# Patient Record
Sex: MALE | Race: BLACK OR AFRICAN AMERICAN | NOT HISPANIC OR LATINO
[De-identification: names, ages, dates, MRNs, and addresses within clinical notes are randomized per-mention and may not be internally consistent; named-entity substitution may affect disease eponyms.]

---

## 2019-11-13 PROBLEM — Z00.00 ENCOUNTER FOR PREVENTIVE HEALTH EXAMINATION: Status: ACTIVE | Noted: 2019-11-13

## 2019-11-20 ENCOUNTER — APPOINTMENT (OUTPATIENT)
Dept: UROLOGY | Facility: CLINIC | Age: 59
End: 2019-11-20
Payer: COMMERCIAL

## 2019-11-20 VITALS
DIASTOLIC BLOOD PRESSURE: 100 MMHG | HEIGHT: 67 IN | HEART RATE: 76 BPM | BODY MASS INDEX: 32.18 KG/M2 | WEIGHT: 205 LBS | SYSTOLIC BLOOD PRESSURE: 150 MMHG

## 2019-11-20 DIAGNOSIS — Z80.42 FAMILY HISTORY OF MALIGNANT NEOPLASM OF PROSTATE: ICD-10-CM

## 2019-11-20 DIAGNOSIS — Z87.09 PERSONAL HISTORY OF OTHER DISEASES OF THE RESPIRATORY SYSTEM: ICD-10-CM

## 2019-11-20 DIAGNOSIS — Z83.3 FAMILY HISTORY OF DIABETES MELLITUS: ICD-10-CM

## 2019-11-20 PROCEDURE — 51798 US URINE CAPACITY MEASURE: CPT | Mod: 59

## 2019-11-20 PROCEDURE — 51741 ELECTRO-UROFLOWMETRY FIRST: CPT

## 2019-11-20 PROCEDURE — 99214 OFFICE O/P EST MOD 30 MIN: CPT | Mod: 25

## 2019-11-20 NOTE — PHYSICAL EXAM
[General Appearance - Well Developed] : well developed [Normal Appearance] : normal appearance [General Appearance - Well Nourished] : well nourished [General Appearance - In No Acute Distress] : no acute distress [Well Groomed] : well groomed [Abdomen Tenderness] : non-tender [Costovertebral Angle Tenderness] : no ~M costovertebral angle tenderness [Abdomen Soft] : soft [Penis Abnormality] : normal circumcised penis [Urethral Meatus] : meatus normal [Urinary Bladder Findings] : the bladder was normal on palpation [Scrotum] : the scrotum was normal [Testes Mass (___cm)] : there were no testicular masses [No Prostate Nodules] : no prostate nodules [Prostate Size ___ (0-4)] : prostate size [unfilled] (scale: 0-4) [] : no respiratory distress [Edema] : no peripheral edema [Respiration, Rhythm And Depth] : normal respiratory rhythm and effort [Exaggerated Use Of Accessory Muscles For Inspiration] : no accessory muscle use [Affect] : the affect was normal [Oriented To Time, Place, And Person] : oriented to person, place, and time [Mood] : the mood was normal [Not Anxious] : not anxious [Normal Station and Gait] : the gait and station were normal for the patient's age [No Focal Deficits] : no focal deficits [No Palpable Adenopathy] : no palpable adenopathy

## 2019-11-20 NOTE — HISTORY OF PRESENT ILLNESS
[FreeTextEntry1] : Patient is without new voiding complaints. He has nocturia x 1, mild postvoid dribbling. He is using sildenafil as needed. Patient had one episode of pink tinged urine. Recent PSA was 3.98ng/ml, free PSA was 13%

## 2019-11-20 NOTE — LETTER BODY
[Courtesy Letter:] : I had the pleasure of seeing your patient, [unfilled], in my office today. [Dear  ___] : Dear  [unfilled], [Please see my note below.] : Please see my note below. [Sincerely,] : Sincerely, [FreeTextEntry3] : Adam Osman MD\par

## 2019-11-20 NOTE — ASSESSMENT
[FreeTextEntry1] : Continue conservative management for now (discussed possible Rezum procedure - patient not interested in long term meds). Continue with sildenafil as needed. Will send urine for analysis, culture and cytology - if unremarkable, reevaluate in 6 months.

## 2019-11-20 NOTE — REVIEW OF SYSTEMS
[Wake up at night to urinate  How many times?  ___] : wakes up to urinate [unfilled] times during the night [Strong urge to urinate] : strong urge to urinate [Wait a long time to urinate] : waits a long time to urinate [Slow urine stream] : slow urine stream [Leakage of urine with urgency] : leakage of urine with urgency [Negative] : Heme/Lymph [Nocturia] : nocturia [Urine Infection (bladder/kidney)] : denies bladder/kidney infections [Pain after urination] : denies pain after urination [Pain at onset of urination] : denies pain during onset of urination [Pain during urination] : denies pain during urination [Discharge from urine canal] : denies discharge from urine canal [Blood in urine that you can see] : denies seeing blood in urine [Told you have blood in urine on a urine test] : denies being told that blood was present in a urine test [History of kidney stones] : denies history of kidney stones [Urine retention] : denies urine retention [Bladder pressure] : denies bladder pressure [Interrupted urine stream] : denies interrupted urine stream [Strain or push to urinate] : denies straining or pushing to urinate [Bladder fullness after urinating] : denies bladder fullness after urinating [Increased pain/discomfort with bladder filling] : denies increased pain/discomfort with bladder filling [Leakage of urine with straining, coughing, laughing] : denies leakage of urine with straining, coughing, and/or laughing [Bladder problems as child. If yes, describe..] : denies bladder problems as child [Unaware of when urine is leaking] : aware of when urine is leaking

## 2020-05-06 ENCOUNTER — APPOINTMENT (OUTPATIENT)
Dept: UROLOGY | Facility: CLINIC | Age: 60
End: 2020-05-06

## 2021-02-11 ENCOUNTER — APPOINTMENT (OUTPATIENT)
Dept: UROLOGY | Facility: CLINIC | Age: 61
End: 2021-02-11
Payer: COMMERCIAL

## 2021-02-11 VITALS — HEART RATE: 73 BPM | DIASTOLIC BLOOD PRESSURE: 95 MMHG | TEMPERATURE: 97.3 F | SYSTOLIC BLOOD PRESSURE: 144 MMHG

## 2021-02-11 PROCEDURE — 99072 ADDL SUPL MATRL&STAF TM PHE: CPT

## 2021-02-11 PROCEDURE — 51741 ELECTRO-UROFLOWMETRY FIRST: CPT

## 2021-02-11 PROCEDURE — 51798 US URINE CAPACITY MEASURE: CPT | Mod: 59

## 2021-02-11 PROCEDURE — 99214 OFFICE O/P EST MOD 30 MIN: CPT | Mod: 25

## 2021-02-11 RX ORDER — SILDENAFIL 20 MG/1
TABLET ORAL
Refills: 0 | Status: DISCONTINUED | COMMUNITY
End: 2021-02-11

## 2021-02-11 NOTE — PHYSICAL EXAM
[General Appearance - Well Developed] : well developed [General Appearance - Well Nourished] : well nourished [Normal Appearance] : normal appearance [Well Groomed] : well groomed [General Appearance - In No Acute Distress] : no acute distress [Abdomen Soft] : soft [Abdomen Tenderness] : non-tender [Costovertebral Angle Tenderness] : no ~M costovertebral angle tenderness [Urethral Meatus] : meatus normal [Penis Abnormality] : normal circumcised penis [Urinary Bladder Findings] : the bladder was normal on palpation [Scrotum] : the scrotum was normal [Testes Mass (___cm)] : there were no testicular masses [No Prostate Nodules] : no prostate nodules [Prostate Size ___ (0-4)] : prostate size [unfilled] (scale: 0-4) [Edema] : no peripheral edema [] : no respiratory distress [Respiration, Rhythm And Depth] : normal respiratory rhythm and effort [Exaggerated Use Of Accessory Muscles For Inspiration] : no accessory muscle use [Oriented To Time, Place, And Person] : oriented to person, place, and time [Affect] : the affect was normal [Mood] : the mood was normal [Not Anxious] : not anxious [Normal Station and Gait] : the gait and station were normal for the patient's age [No Focal Deficits] : no focal deficits [No Palpable Adenopathy] : no palpable adenopathy

## 2021-02-22 ENCOUNTER — NON-APPOINTMENT (OUTPATIENT)
Age: 61
End: 2021-02-22

## 2021-02-22 NOTE — HISTORY OF PRESENT ILLNESS
[FreeTextEntry1] : The patient is without new urologic complaints.  He is voiding with somewhat slow stream and mild urgency but no dysuria or hematuria.  He has nocturia x1.  The patient has adequate erections.

## 2021-02-22 NOTE — LETTER BODY
[Dear  ___] : Dear  [unfilled], [Courtesy Letter:] : I had the pleasure of seeing your patient, [unfilled], in my office today. [Please see my note below.] : Please see my note below. [Consult Closing:] : Thank you very much for allowing me to participate in the care of this patient.  If you have any questions, please do not hesitate to contact me. [Sincerely,] : Sincerely, [FreeTextEntry3] : Adam Osman MD\par

## 2021-02-22 NOTE — REVIEW OF SYSTEMS
[Wake up at night to urinate  How many times?  ___] : wakes up to urinate [unfilled] times during the night [Slow urine stream] : slow urine stream [Negative] : Heme/Lymph [Nocturia] : nocturia [Strong urge to urinate] : strong urge to urinate [Wait a long time to urinate] : waits a long time to urinate [Leakage of urine with urgency] : leakage of urine with urgency [Urine Infection (bladder/kidney)] : denies bladder/kidney infections [Pain during urination] : denies pain during urination [Pain at onset of urination] : denies pain during onset of urination [Pain after urination] : denies pain after urination [Blood in urine that you can see] : denies seeing blood in urine [Told you have blood in urine on a urine test] : denies being told that blood was present in a urine test [Discharge from urine canal] : denies discharge from urine canal [History of kidney stones] : denies history of kidney stones [Urine retention] : denies urine retention [Bladder pressure] : denies bladder pressure [Strain or push to urinate] : denies straining or pushing to urinate [Interrupted urine stream] : denies interrupted urine stream [Bladder fullness after urinating] : denies bladder fullness after urinating [Increased pain/discomfort with bladder filling] : denies increased pain/discomfort with bladder filling [Bladder problems as child. If yes, describe..] : denies bladder problems as child [Leakage of urine with straining, coughing, laughing] : denies leakage of urine with straining, coughing, and/or laughing [Unaware of when urine is leaking] : aware of when urine is leaking

## 2021-02-22 NOTE — ASSESSMENT
[FreeTextEntry1] : The patient is stable clinically and we will continue him on conservative management of his BPH.  He will have a PSA determination and if that is unremarkable, I will see him again in 6 months.

## 2021-08-19 ENCOUNTER — APPOINTMENT (OUTPATIENT)
Dept: UROLOGY | Facility: CLINIC | Age: 61
End: 2021-08-19
Payer: COMMERCIAL

## 2021-08-19 ENCOUNTER — APPOINTMENT (OUTPATIENT)
Dept: UROLOGY | Facility: CLINIC | Age: 61
End: 2021-08-19

## 2021-08-19 VITALS
OXYGEN SATURATION: 100 % | HEART RATE: 80 BPM | TEMPERATURE: 97.7 F | DIASTOLIC BLOOD PRESSURE: 84 MMHG | RESPIRATION RATE: 18 BRPM | SYSTOLIC BLOOD PRESSURE: 128 MMHG

## 2021-08-19 PROCEDURE — 51798 US URINE CAPACITY MEASURE: CPT

## 2021-08-19 PROCEDURE — 99214 OFFICE O/P EST MOD 30 MIN: CPT

## 2021-08-19 NOTE — HISTORY OF PRESENT ILLNESS
[FreeTextEntry1] : The patient is without change in status.  He is voiding with a somewhat slow stream, mild hesitancy and nocturia x0-1.  He denies dysuria or hematuria.  PSA on the last visit was elevated 4.2 ng/mL (up from 3.89 previously).  Patient is reporting adequate erectile function.

## 2021-08-19 NOTE — ASSESSMENT
[FreeTextEntry1] : The patient is stable clinically with mild worsening of his symptoms but this is not bothersome.  He has good bladder emptying.  His mild PSA elevation is likely due to his BPH.  We will send a repeat study today including the 4K score and if these are unremarkable, we will continue conservative management and reevaluate him again in 6 months.

## 2021-08-19 NOTE — LETTER BODY
[Dear  ___] : Dear  [unfilled], [Courtesy Letter:] : I had the pleasure of seeing your patient, [unfilled], in my office today. [Please see my note below.] : Please see my note below. [Consult Closing:] : Thank you very much for allowing me to participate in the care of this patient.  If you have any questions, please do not hesitate to contact me. [Sincerely,] : Sincerely, [FreeTextEntry3] : Adam Osman MD

## 2021-08-19 NOTE — PHYSICAL EXAM
[General Appearance - Well Developed] : well developed [Normal Appearance] : normal appearance [General Appearance - Well Nourished] : well nourished [Well Groomed] : well groomed [General Appearance - In No Acute Distress] : no acute distress [Abdomen Soft] : soft [Abdomen Tenderness] : non-tender [Costovertebral Angle Tenderness] : no ~M costovertebral angle tenderness [Urethral Meatus] : meatus normal [Penis Abnormality] : normal circumcised penis [Urinary Bladder Findings] : the bladder was normal on palpation [Scrotum] : the scrotum was normal [Testes Mass (___cm)] : there were no testicular masses [No Prostate Nodules] : no prostate nodules [Prostate Size ___ (0-4)] : prostate size [unfilled] (scale: 0-4) [Edema] : no peripheral edema [] : no respiratory distress [Respiration, Rhythm And Depth] : normal respiratory rhythm and effort [Exaggerated Use Of Accessory Muscles For Inspiration] : no accessory muscle use [Oriented To Time, Place, And Person] : oriented to person, place, and time [Affect] : the affect was normal [Mood] : the mood was normal [Not Anxious] : not anxious [Normal Station and Gait] : the gait and station were normal for the patient's age [No Focal Deficits] : no focal deficits [No Palpable Adenopathy] : no palpable adenopathy

## 2021-08-19 NOTE — REVIEW OF SYSTEMS
yes [Wait a long time to urinate] : waits a long time to urinate [Slow urine stream] : slow urine stream [Nocturia] : nocturia [Wake up at night to urinate  How many times?  ___] : wakes up to urinate [unfilled] times during the night [Strong urge to urinate] : strong urge to urinate [Leakage of urine with urgency] : leakage of urine with urgency [Negative] : Heme/Lymph [Urine Infection (bladder/kidney)] : denies bladder/kidney infections [Pain during urination] : denies pain during urination [Pain at onset of urination] : denies pain during onset of urination [Pain after urination] : denies pain after urination [Blood in urine that you can see] : denies seeing blood in urine [Told you have blood in urine on a urine test] : denies being told that blood was present in a urine test [Discharge from urine canal] : denies discharge from urine canal [History of kidney stones] : denies history of kidney stones [Urine retention] : denies urine retention [Bladder pressure] : denies bladder pressure [Strain or push to urinate] : denies straining or pushing to urinate [Interrupted urine stream] : denies interrupted urine stream [Bladder fullness after urinating] : denies bladder fullness after urinating [Increased pain/discomfort with bladder filling] : denies increased pain/discomfort with bladder filling [Bladder problems as child. If yes, describe..] : denies bladder problems as child [Leakage of urine with straining, coughing, laughing] : denies leakage of urine with straining, coughing, and/or laughing [Unaware of when urine is leaking] : aware of when urine is leaking

## 2021-08-20 LAB
PSA FREE FLD-MCNC: 9 %
PSA FREE SERPL-MCNC: 0.5 NG/ML
PSA SERPL-MCNC: 5.44 NG/ML

## 2021-08-25 ENCOUNTER — NON-APPOINTMENT (OUTPATIENT)
Age: 61
End: 2021-08-25

## 2021-08-30 ENCOUNTER — APPOINTMENT (OUTPATIENT)
Dept: UROLOGY | Facility: CLINIC | Age: 61
End: 2021-08-30

## 2021-08-30 LAB
4K SCORE CALCULATION: 41 %
FREE PSA: 0.53 NG/ML
PERCENT FREE PSA: 10 %
TOTAL PSA: 5.51 NG/ML

## 2021-09-16 ENCOUNTER — NON-APPOINTMENT (OUTPATIENT)
Age: 61
End: 2021-09-16

## 2021-09-17 ENCOUNTER — EMERGENCY (EMERGENCY)
Facility: HOSPITAL | Age: 61
LOS: 1 days | Discharge: HOME | End: 2021-09-17
Admitting: EMERGENCY MEDICINE
Payer: COMMERCIAL

## 2021-09-17 VITALS
SYSTOLIC BLOOD PRESSURE: 146 MMHG | TEMPERATURE: 98 F | HEIGHT: 67 IN | HEART RATE: 71 BPM | OXYGEN SATURATION: 97 % | DIASTOLIC BLOOD PRESSURE: 88 MMHG | WEIGHT: 190.04 LBS | RESPIRATION RATE: 16 BRPM

## 2021-09-17 VITALS
TEMPERATURE: 99 F | SYSTOLIC BLOOD PRESSURE: 140 MMHG | DIASTOLIC BLOOD PRESSURE: 84 MMHG | OXYGEN SATURATION: 98 % | RESPIRATION RATE: 18 BRPM | HEART RATE: 70 BPM

## 2021-09-17 DIAGNOSIS — Z88.0 ALLERGY STATUS TO PENICILLIN: ICD-10-CM
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
## 2021-09-17 DIAGNOSIS — R51.9 HEADACHE, UNSPECIFIED: ICD-10-CM
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
## 2021-09-17 DIAGNOSIS — S09.90XA UNSPECIFIED INJURY OF HEAD, INITIAL ENCOUNTER: ICD-10-CM

## 2021-09-17 DIAGNOSIS — R11.0 NAUSEA: ICD-10-CM

## 2021-09-17 PROCEDURE — 70450 CT HEAD/BRAIN W/O DYE: CPT | Mod: 26,MG

## 2021-09-17 PROCEDURE — G1004: CPT

## 2021-09-17 PROCEDURE — 99284 EMERGENCY DEPT VISIT MOD MDM: CPT

## 2021-09-17 PROCEDURE — 99284 EMERGENCY DEPT VISIT MOD MDM: CPT | Mod: 25

## 2021-09-17 PROCEDURE — 70450 CT HEAD/BRAIN W/O DYE: CPT | Mod: MG

## 2021-09-17 NOTE — ED PROVIDER NOTE - PATIENT PORTAL LINK FT
You can access the FollowMyHealth Patient Portal offered by [facility] by registering at the following website: [email or web address]. By joining [employer]’s FollowMyHealth portal, you will also be able to view your health information using other applications (apps) compatible with our system.

## 2021-09-17 NOTE — ED PROVIDER NOTE - MUSCULOSKELETAL, MLM
Spine appears normal, no spine tenderness, range of motion is not limited, no muscle or joint tenderness
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
## 2021-09-30 ENCOUNTER — APPOINTMENT (OUTPATIENT)
Dept: UROLOGY | Facility: CLINIC | Age: 61
End: 2021-09-30

## 2021-10-25 ENCOUNTER — RESULT REVIEW (OUTPATIENT)
Age: 61
End: 2021-10-25

## 2021-10-25 ENCOUNTER — TRANSCRIPTION ENCOUNTER (OUTPATIENT)
Age: 61
End: 2021-10-25

## 2021-10-25 ENCOUNTER — EMERGENCY (EMERGENCY)
Facility: HOSPITAL | Age: 61
LOS: 1 days | Discharge: HOME | End: 2021-10-25
Attending: EMERGENCY MEDICINE | Admitting: EMERGENCY MEDICINE
Payer: COMMERCIAL

## 2021-10-25 VITALS
HEIGHT: 67 IN | RESPIRATION RATE: 18 BRPM | HEART RATE: 77 BPM | DIASTOLIC BLOOD PRESSURE: 78 MMHG | TEMPERATURE: 99 F | SYSTOLIC BLOOD PRESSURE: 163 MMHG | OXYGEN SATURATION: 97 %

## 2021-10-25 DIAGNOSIS — Z88.0 ALLERGY STATUS TO PENICILLIN: ICD-10-CM

## 2021-10-25 DIAGNOSIS — Z01.818 ENCOUNTER FOR OTHER PREPROCEDURAL EXAMINATION: ICD-10-CM

## 2021-10-25 PROCEDURE — 71046 X-RAY EXAM CHEST 2 VIEWS: CPT

## 2021-10-25 PROCEDURE — 99283 EMERGENCY DEPT VISIT LOW MDM: CPT

## 2021-10-25 PROCEDURE — 99283 EMERGENCY DEPT VISIT LOW MDM: CPT | Mod: 25

## 2021-10-25 PROCEDURE — 71046 X-RAY EXAM CHEST 2 VIEWS: CPT | Mod: 26

## 2021-10-25 NOTE — ED PROVIDER NOTE - CLINICAL SUMMARY MEDICAL DECISION MAKING FREE TEXT BOX
61M here for preop CXR prior to scheduled prostate bx at [facility] tomorrow. He has no complaints.     CXR performed and pt informed results will be viewable by his providers at [facility] tomorrow.     Stable for DC.

## 2021-10-25 NOTE — ED PROVIDER NOTE - NSFOLLOWUPINSTRUCTIONS_ED_ALL_ED_FT
Please follow up at [facility] tomorrow for your scheduled prostate biopsy.   Follow preop instructions provided by your doctor.

## 2021-10-25 NOTE — ED ADULT NURSE NOTE - OBJECTIVE STATEMENT
60 yo M presents to ED stating, " I need a CXR for clearance for my prostate procedure tm." Denies any complaints at this time. Pt A&Ox4, ambulatory with steady gait, speaking in clear/full sentences, no acute distress, vital signs stable.

## 2021-10-25 NOTE — ED PROVIDER NOTE - OBJECTIVE STATEMENT
61M here for preop CXR prior to scheduled prostate bx at [facility] tomorrow. He has no complaints.

## 2021-10-25 NOTE — ED ADULT TRIAGE NOTE - CHIEF COMPLAINT QUOTE
Pt sent in by MD for pre-op chest XR. Pt reports scheduled for prostate biopsy tomorrow and "I just need a chest x ray to clear me."

## 2021-10-25 NOTE — ED PROVIDER NOTE - PATIENT PORTAL LINK FT
You can access the FollowMyHealth Patient Portal offered by [facility] by registering at the following website: [email or web address]. By joining [email or web address]’s FollowMyHealth portal, you will also be able to view your health information using other applications (apps) compatible with our system.

## 2021-10-26 ENCOUNTER — OUTPATIENT (OUTPATIENT)
Dept: OUTPATIENT SERVICES | Facility: HOSPITAL | Age: 61
LOS: 1 days | Discharge: HOME | End: 2021-10-26
Payer: COMMERCIAL

## 2021-10-26 ENCOUNTER — NON-APPOINTMENT (OUTPATIENT)
Age: 61
End: 2021-10-26

## 2021-10-26 ENCOUNTER — APPOINTMENT (OUTPATIENT)
Dept: UROLOGY | Facility: AMBULATORY SURGERY CENTER | Age: 61
End: 2021-10-26

## 2021-10-26 LAB — GLUCOSE BLDC GLUCOMTR-MCNC: 129 MG/DL — HIGH (ref 70–99)

## 2021-10-26 PROCEDURE — 88305 TISSUE EXAM BY PATHOLOGIST: CPT | Mod: 26

## 2021-10-26 PROCEDURE — 55706 BX PRST8 NDL SAT SAMPLING: CPT

## 2021-10-26 PROCEDURE — 76872 US TRANSRECTAL: CPT | Mod: 26

## 2021-10-26 PROCEDURE — 55700: CPT

## 2021-11-02 ENCOUNTER — NON-APPOINTMENT (OUTPATIENT)
Age: 61
End: 2021-11-02

## 2021-11-02 LAB — SURGICAL PATHOLOGY STUDY: SIGNIFICANT CHANGE UP

## 2021-12-02 ENCOUNTER — LABORATORY RESULT (OUTPATIENT)
Age: 61
End: 2021-12-02

## 2021-12-02 ENCOUNTER — APPOINTMENT (OUTPATIENT)
Dept: UROLOGY | Facility: CLINIC | Age: 61
End: 2021-12-02
Payer: COMMERCIAL

## 2021-12-02 VITALS
BODY MASS INDEX: 30.61 KG/M2 | OXYGEN SATURATION: 97 % | HEART RATE: 80 BPM | TEMPERATURE: 97.6 F | WEIGHT: 195 LBS | RESPIRATION RATE: 16 BRPM | HEIGHT: 67 IN | DIASTOLIC BLOOD PRESSURE: 81 MMHG | SYSTOLIC BLOOD PRESSURE: 132 MMHG

## 2021-12-02 PROCEDURE — 99215 OFFICE O/P EST HI 40 MIN: CPT

## 2021-12-02 NOTE — ASSESSMENT
[FreeTextEntry1] : Thus, the patient is stable clinically.  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  Today we had an extensive discussion about MRI, biopsy and Decipher results and we discussed all the potential therapeutic strategies including active surveillance, brachytherapy, external beam radiation therapy, robotically assisted laparoscopic prostatectomy and focal therapy (cryosurgery, HIFU) at length.  We discussed the timing of potential treatments and follow-ups.  The patient is opting for active surveillance and he understands that he will require periodic digital rectal examinations, PSA tests, MRIs and possible repeat biopsies.  He was reassured about his hematospermia which should resolve.  The patient will come back in 2 months for reassessment and repeat PSA determination and if that is stable, he will have a repeat study 3 months after that and will have a repeat MRI at that time as well.

## 2021-12-02 NOTE — HISTORY OF PRESENT ILLNESS
[FreeTextEntry1] : The patient is without new clinical complaints except for gradually decreasing hematospermia.  Prostate biopsy did reveal a [place] 7 (3+4) adenocarcinoma in the region of interest identified by MRI, as well as a [place] 6 (3+3) adenocarcinoma in one of the systematic biopsies from another location.  The patient had a Decipher genetic test which revealed low risk disease.

## 2021-12-02 NOTE — PHYSICAL EXAM
[General Appearance - Well Developed] : well developed [General Appearance - Well Nourished] : well nourished [Normal Appearance] : normal appearance [Well Groomed] : well groomed [General Appearance - In No Acute Distress] : no acute distress [Abdomen Soft] : soft [Abdomen Tenderness] : non-tender [Costovertebral Angle Tenderness] : no ~M costovertebral angle tenderness [Urethral Meatus] : meatus normal [Penis Abnormality] : normal circumcised penis [Urinary Bladder Findings] : the bladder was normal on palpation [Scrotum] : the scrotum was normal [Testes Mass (___cm)] : there were no testicular masses [Edema] : no peripheral edema [] : no respiratory distress [Respiration, Rhythm And Depth] : normal respiratory rhythm and effort [Exaggerated Use Of Accessory Muscles For Inspiration] : no accessory muscle use [Oriented To Time, Place, And Person] : oriented to person, place, and time [Affect] : the affect was normal [Mood] : the mood was normal [Not Anxious] : not anxious [Normal Station and Gait] : the gait and station were normal for the patient's age [No Focal Deficits] : no focal deficits [No Palpable Adenopathy] : no palpable adenopathy

## 2021-12-02 NOTE — REVIEW OF SYSTEMS
[Wake up at night to urinate  How many times?  ___] : wakes up to urinate [unfilled] times during the night [Negative] : Heme/Lymph [Nocturia] : nocturia [Urine Infection (bladder/kidney)] : denies bladder/kidney infections [Pain during urination] : denies pain during urination [Pain at onset of urination] : denies pain during onset of urination [Pain after urination] : denies pain after urination [Blood in urine that you can see] : denies seeing blood in urine [Told you have blood in urine on a urine test] : denies being told that blood was present in a urine test [Discharge from urine canal] : denies discharge from urine canal [History of kidney stones] : denies history of kidney stones [Urine retention] : denies urine retention [Strong urge to urinate] : strong urge to urinate [Bladder pressure] : denies bladder pressure [Strain or push to urinate] : denies straining or pushing to urinate [Wait a long time to urinate] : waits a long time to urinate [Slow urine stream] : slow urine stream [Interrupted urine stream] : denies interrupted urine stream [Bladder fullness after urinating] : denies bladder fullness after urinating [Increased pain/discomfort with bladder filling] : denies increased pain/discomfort with bladder filling [Bladder problems as child. If yes, describe..] : denies bladder problems as child [Leakage of urine with urgency] : leakage of urine with urgency [Leakage of urine with straining, coughing, laughing] : denies leakage of urine with straining, coughing, and/or laughing [Unaware of when urine is leaking] : aware of when urine is leaking

## 2022-02-24 ENCOUNTER — APPOINTMENT (OUTPATIENT)
Dept: UROLOGY | Facility: CLINIC | Age: 62
End: 2022-02-24
Payer: COMMERCIAL

## 2022-02-24 VITALS
HEART RATE: 78 BPM | RESPIRATION RATE: 18 BRPM | HEIGHT: 67 IN | SYSTOLIC BLOOD PRESSURE: 138 MMHG | DIASTOLIC BLOOD PRESSURE: 91 MMHG | TEMPERATURE: 97.8 F | OXYGEN SATURATION: 98 % | WEIGHT: 199 LBS | BODY MASS INDEX: 31.23 KG/M2

## 2022-02-24 PROCEDURE — 51798 US URINE CAPACITY MEASURE: CPT

## 2022-02-24 PROCEDURE — 51741 ELECTRO-UROFLOWMETRY FIRST: CPT

## 2022-02-24 PROCEDURE — 99214 OFFICE O/P EST MOD 30 MIN: CPT | Mod: 25

## 2022-02-24 NOTE — REVIEW OF SYSTEMS
[Wake up at night to urinate  How many times?  ___] : wakes up to urinate [unfilled] times during the night [Nocturia] : nocturia [Urine Infection (bladder/kidney)] : denies bladder/kidney infections [Pain during urination] : denies pain during urination [Pain at onset of urination] : denies pain during onset of urination [Pain after urination] : denies pain after urination [Blood in urine that you can see] : denies seeing blood in urine [Told you have blood in urine on a urine test] : denies being told that blood was present in a urine test [Discharge from urine canal] : denies discharge from urine canal [History of kidney stones] : denies history of kidney stones [Urine retention] : denies urine retention [Strong urge to urinate] : strong urge to urinate [Bladder pressure] : denies bladder pressure [Strain or push to urinate] : denies straining or pushing to urinate [Wait a long time to urinate] : waits a long time to urinate [Slow urine stream] : slow urine stream [Interrupted urine stream] : denies interrupted urine stream [Bladder fullness after urinating] : denies bladder fullness after urinating [Increased pain/discomfort with bladder filling] : denies increased pain/discomfort with bladder filling [Bladder problems as child. If yes, describe..] : denies bladder problems as child [Leakage of urine with urgency] : leakage of urine with urgency [Leakage of urine with straining, coughing, laughing] : denies leakage of urine with straining, coughing, and/or laughing [Unaware of when urine is leaking] : aware of when urine is leaking [Negative] : Heme/Lymph

## 2022-02-24 NOTE — ASSESSMENT
[FreeTextEntry1] : The patient stable clinically continue active surveillance of his prostate cancer and conservative management of his BPH.  The patient will have his PSA determination today and if that is stable we will see him again in 3 months.  He will have a repeat MRI prior to his next visit.

## 2022-02-24 NOTE — HISTORY OF PRESENT ILLNESS
[FreeTextEntry1] : The patient is without new clinical complaints.  He is voiding with an adequate stream, nocturia x1 and no dysuria or hematuria.

## 2022-02-25 LAB — PSA SERPL-MCNC: 5.8 NG/ML

## 2022-03-11 ENCOUNTER — NON-APPOINTMENT (OUTPATIENT)
Age: 62
End: 2022-03-11

## 2022-04-22 ENCOUNTER — NON-APPOINTMENT (OUTPATIENT)
Age: 62
End: 2022-04-22

## 2022-05-17 ENCOUNTER — EMERGENCY (EMERGENCY)
Facility: HOSPITAL | Age: 62
LOS: 1 days | Discharge: HOME | End: 2022-05-17
Attending: EMERGENCY MEDICINE | Admitting: EMERGENCY MEDICINE
Payer: COMMERCIAL

## 2022-05-17 VITALS
TEMPERATURE: 98 F | SYSTOLIC BLOOD PRESSURE: 140 MMHG | RESPIRATION RATE: 18 BRPM | HEART RATE: 88 BPM | DIASTOLIC BLOOD PRESSURE: 78 MMHG | OXYGEN SATURATION: 98 %

## 2022-05-17 VITALS
TEMPERATURE: 98 F | HEIGHT: 67 IN | RESPIRATION RATE: 16 BRPM | SYSTOLIC BLOOD PRESSURE: 143 MMHG | DIASTOLIC BLOOD PRESSURE: 96 MMHG | WEIGHT: 205.03 LBS | HEART RATE: 89 BPM | OXYGEN SATURATION: 96 %

## 2022-05-17 DIAGNOSIS — R07.89 OTHER CHEST PAIN: ICD-10-CM

## 2022-05-17 DIAGNOSIS — Z20.822 CONTACT WITH AND (SUSPECTED) EXPOSURE TO COVID-19: ICD-10-CM

## 2022-05-17 DIAGNOSIS — Z88.0 ALLERGY STATUS TO PENICILLIN: ICD-10-CM

## 2022-05-17 DIAGNOSIS — J45.901 UNSPECIFIED ASTHMA WITH (ACUTE) EXACERBATION: ICD-10-CM

## 2022-05-17 DIAGNOSIS — R06.02 SHORTNESS OF BREATH: ICD-10-CM

## 2022-05-17 DIAGNOSIS — J30.2 OTHER SEASONAL ALLERGIC RHINITIS: ICD-10-CM

## 2022-05-17 DIAGNOSIS — C61 MALIGNANT NEOPLASM OF PROSTATE: ICD-10-CM

## 2022-05-17 LAB
ALBUMIN SERPL ELPH-MCNC: 4.5 G/DL — SIGNIFICANT CHANGE UP (ref 3.3–5)
ALP SERPL-CCNC: 121 U/L — HIGH (ref 40–120)
ALT FLD-CCNC: 25 U/L — SIGNIFICANT CHANGE UP (ref 10–45)
ANION GAP SERPL CALC-SCNC: 10 MMOL/L — SIGNIFICANT CHANGE UP (ref 5–17)
APTT BLD: 32.7 SEC — SIGNIFICANT CHANGE UP (ref 27.5–35.5)
AST SERPL-CCNC: 34 U/L — SIGNIFICANT CHANGE UP (ref 10–40)
BASOPHILS # BLD AUTO: 0.05 K/UL — SIGNIFICANT CHANGE UP (ref 0–0.2)
BASOPHILS NFR BLD AUTO: 1.1 % — SIGNIFICANT CHANGE UP (ref 0–2)
BILIRUB SERPL-MCNC: 0.5 MG/DL — SIGNIFICANT CHANGE UP (ref 0.2–1.2)
BUN SERPL-MCNC: 9 MG/DL — SIGNIFICANT CHANGE UP (ref 7–23)
CALCIUM SERPL-MCNC: 9.7 MG/DL — SIGNIFICANT CHANGE UP (ref 8.4–10.5)
CHLORIDE SERPL-SCNC: 101 MMOL/L — SIGNIFICANT CHANGE UP (ref 96–108)
CO2 SERPL-SCNC: 29 MMOL/L — SIGNIFICANT CHANGE UP (ref 22–31)
CREAT SERPL-MCNC: 0.98 MG/DL — SIGNIFICANT CHANGE UP (ref 0.5–1.3)
EGFR: 87 ML/MIN/1.73M2 — SIGNIFICANT CHANGE UP
EOSINOPHIL # BLD AUTO: 0.28 K/UL — SIGNIFICANT CHANGE UP (ref 0–0.5)
EOSINOPHIL NFR BLD AUTO: 6.2 % — HIGH (ref 0–6)
GLUCOSE SERPL-MCNC: 111 MG/DL — HIGH (ref 70–99)
HCT VFR BLD CALC: 47.5 % — SIGNIFICANT CHANGE UP (ref 39–50)
HGB BLD-MCNC: 15.1 G/DL — SIGNIFICANT CHANGE UP (ref 13–17)
IMM GRANULOCYTES NFR BLD AUTO: 0.2 % — SIGNIFICANT CHANGE UP (ref 0–1.5)
INR BLD: 1.02 — SIGNIFICANT CHANGE UP (ref 0.88–1.16)
LYMPHOCYTES # BLD AUTO: 1.68 K/UL — SIGNIFICANT CHANGE UP (ref 1–3.3)
LYMPHOCYTES # BLD AUTO: 37.3 % — SIGNIFICANT CHANGE UP (ref 13–44)
MCHC RBC-ENTMCNC: 27 PG — SIGNIFICANT CHANGE UP (ref 27–34)
MCHC RBC-ENTMCNC: 31.8 GM/DL — LOW (ref 32–36)
MCV RBC AUTO: 84.8 FL — SIGNIFICANT CHANGE UP (ref 80–100)
MONOCYTES # BLD AUTO: 0.52 K/UL — SIGNIFICANT CHANGE UP (ref 0–0.9)
MONOCYTES NFR BLD AUTO: 11.6 % — SIGNIFICANT CHANGE UP (ref 2–14)
NEUTROPHILS # BLD AUTO: 1.96 K/UL — SIGNIFICANT CHANGE UP (ref 1.8–7.4)
NEUTROPHILS NFR BLD AUTO: 43.6 % — SIGNIFICANT CHANGE UP (ref 43–77)
NRBC # BLD: 0 /100 WBCS — SIGNIFICANT CHANGE UP (ref 0–0)
PLATELET # BLD AUTO: 187 K/UL — SIGNIFICANT CHANGE UP (ref 150–400)
POTASSIUM SERPL-MCNC: 3.8 MMOL/L — SIGNIFICANT CHANGE UP (ref 3.5–5.3)
POTASSIUM SERPL-SCNC: 3.8 MMOL/L — SIGNIFICANT CHANGE UP (ref 3.5–5.3)
PROT SERPL-MCNC: 7.6 G/DL — SIGNIFICANT CHANGE UP (ref 6–8.3)
PROTHROM AB SERPL-ACNC: 12.2 SEC — SIGNIFICANT CHANGE UP (ref 10.5–13.4)
RBC # BLD: 5.6 M/UL — SIGNIFICANT CHANGE UP (ref 4.2–5.8)
RBC # FLD: 12.6 % — SIGNIFICANT CHANGE UP (ref 10.3–14.5)
SODIUM SERPL-SCNC: 140 MMOL/L — SIGNIFICANT CHANGE UP (ref 135–145)
TROPONIN T SERPL-MCNC: 0.01 NG/ML — SIGNIFICANT CHANGE UP (ref 0–0.01)
WBC # BLD: 4.5 K/UL — SIGNIFICANT CHANGE UP (ref 3.8–10.5)
WBC # FLD AUTO: 4.5 K/UL — SIGNIFICANT CHANGE UP (ref 3.8–10.5)

## 2022-05-17 PROCEDURE — 85730 THROMBOPLASTIN TIME PARTIAL: CPT

## 2022-05-17 PROCEDURE — 99285 EMERGENCY DEPT VISIT HI MDM: CPT | Mod: 25

## 2022-05-17 PROCEDURE — 80053 COMPREHEN METABOLIC PANEL: CPT

## 2022-05-17 PROCEDURE — 94640 AIRWAY INHALATION TREATMENT: CPT

## 2022-05-17 PROCEDURE — 71046 X-RAY EXAM CHEST 2 VIEWS: CPT

## 2022-05-17 PROCEDURE — 71046 X-RAY EXAM CHEST 2 VIEWS: CPT | Mod: 26

## 2022-05-17 PROCEDURE — U0005: CPT

## 2022-05-17 PROCEDURE — 93005 ELECTROCARDIOGRAM TRACING: CPT

## 2022-05-17 PROCEDURE — 85610 PROTHROMBIN TIME: CPT

## 2022-05-17 PROCEDURE — U0003: CPT

## 2022-05-17 PROCEDURE — 36415 COLL VENOUS BLD VENIPUNCTURE: CPT

## 2022-05-17 PROCEDURE — 84484 ASSAY OF TROPONIN QUANT: CPT

## 2022-05-17 PROCEDURE — 96374 THER/PROPH/DIAG INJ IV PUSH: CPT

## 2022-05-17 PROCEDURE — 99284 EMERGENCY DEPT VISIT MOD MDM: CPT | Mod: 25

## 2022-05-17 PROCEDURE — 93010 ELECTROCARDIOGRAM REPORT: CPT | Mod: NC

## 2022-05-17 PROCEDURE — 85025 COMPLETE CBC W/AUTO DIFF WBC: CPT

## 2022-05-17 RX ORDER — IPRATROPIUM/ALBUTEROL SULFATE 18-103MCG
3 AEROSOL WITH ADAPTER (GRAM) INHALATION
Refills: 0 | Status: COMPLETED | OUTPATIENT
Start: 2022-05-17 | End: 2022-05-17

## 2022-05-17 RX ADMIN — Medication 3 MILLILITER(S): at 20:01

## 2022-05-17 RX ADMIN — Medication 125 MILLIGRAM(S): at 19:59

## 2022-05-17 RX ADMIN — Medication 3 MILLILITER(S): at 19:59

## 2022-05-17 RX ADMIN — Medication 3 MILLILITER(S): at 18:52

## 2022-05-17 NOTE — ED PROVIDER NOTE - PHYSICAL EXAMINATION
CONST: nontoxic NAD speaking in full sentences  HEAD: atraumatic  EYES: conjunctivae clear  ENT: mmm  NECK: supple/FROM, no jvd  CARD: rrr no murmurs  CHEST: +few scattered end exp wheezing, no rales/rhonchi/stridor/tripoding/tachypnea  ABD: soft, nd, nttp, no rebound/guarding  EXT: FROM, symmetric distal pulses intact  SKIN: warm, dry, no rash, no pedal edema/ttp/rash, cap refill <2sec  NEURO: a+ox3, 5/5 strength x4, gross sensation intact x4, baseline gait

## 2022-05-17 NOTE — ED PROVIDER NOTE - ST/T WAVE
1.  Cataract OU: Observe for now without intervention. The patient was advised to contact us if any change or worsening of vision2. СЕРГЕЙ w/ PEK OU- Stable. The continuation of artificial tears were recommended. Cauterized LL's OU. 3. Allergic Conjunctivitis OU by symptoms- Ok to use Zaditor OU BID. The condition was  discussed with the patient. Avoidance of allergens and cool compresses were recommended. 4. Return for an appointment for a 10/check K in 6 months with Dr. Darnell Simmons.
no st/t changes

## 2022-05-17 NOTE — ED ADULT TRIAGE NOTE - BANDS:
"""Follow dry ARMD without treatment. MVI/AREDS/Amsler. Patient to call if vision changes or distortion increases. Good diet/do not smoke. """
"""Recommended OTC artificial tears two - four times a day as needed. """
"""Recommended warm compresses to the left eye
"""Recommended warm compresses to the right eye
Allergy;

## 2022-05-17 NOTE — ED PROVIDER NOTE - OBJECTIVE STATEMENT
62M nonsmoker, asthma (last hospitalization 2018, no intubations), recently dx prostate ca (not on tx yet), c/o <24h progressive sob/chest tightness. feels similar to prior asthma exacerbations. +seasonal allergies (rhinorrhea/congestion). no fever/chills, no cough, no abd pain/n/v, no pedal edema/pain/rash, no recent travel, no prior covid, no trauma, no etoh-dpt/ivdu.     urology: edwin barrett

## 2022-05-17 NOTE — ED PROVIDER NOTE - PATIENT PORTAL LINK FT
You can access the FollowMyHealth Patient Portal offered by [facility] by registering at the following website: [email or web address]. By joining [employer]’s FollowMyHealth portal, you will also be able to view your health information using other applications (apps) compatible with our system.

## 2022-05-17 NOTE — ED ADULT NURSE NOTE - NSIMPLEMENTINTERV_GEN_ALL_ED
Implemented All Universal Safety Interventions:  [place] to call system. Call bell, personal items and telephone within reach. Instruct patient to call for assistance. Room bathroom lighting operational. Non-slip footwear when patient is off stretcher. Physically safe environment: no spills, clutter or unnecessary equipment. Stretcher in lowest position, wheels locked, appropriate side rails in place.

## 2022-05-17 NOTE — ED PROVIDER NOTE - NSFOLLOWUPINSTRUCTIONS_ED_ALL_ED_FT
CONTINUE ALBUTEROL AS NEEDED FOR SHORTNESS OF BREATH/WHEEZING. START PREDNISONE AS PRESCRIBED.    PLEASE FOLLOW-UP WITH YOUR PCP IN 1-2 DAYS.    ANY IMAGING RESULTS GIVEN IN THE EMERGENCY DEPARTMENT ARE PRELIMINARY UNTIL FORMALLY READ BY A RADIOLOGIST. YOU WILL BE CONTACTED IF THERE ARE ANY SIGNIFICANT CHANGES IN THE FINAL READ.    PLEASE RETURN TO THE EMERGENCY DEPARTMENT IF FEVER, CHEST PAIN, SHORTNESS OF BREATH, ABDOMINAL PAIN, VOMITING, OTHER CONCERNING SYMPTOMS.    PLEASE CONTACT SHYANNE STEPHEN ([facility] EMERGENCY DEPARTMENT CLINICAL REFERRAL COORDINATOR) TO ASSIST IN SCHEDULING YOUR FOLLOW-UP APPOINTMENT.    Monday - Friday 11am-7pm  [phone]  [email or web address]                Asthma, Adult       Asthma is a long-term (chronic) condition that causes recurrent episodes in which the airways become tight and narrow. The airways are the passages that lead from the nose and mouth down into the lungs. Asthma episodes, also called asthma attacks, can cause coughing, wheezing, shortness of breath, and chest pain. The airways can also fill with mucus. During an attack, it can be difficult to breathe. Asthma attacks can range from minor to life threatening.    Asthma cannot be cured, but medicines and lifestyle changes can help control it and treat acute attacks.      What are the causes?    This condition is believed to be caused by inherited (genetic) and environmental factors, but its exact cause is not known.    There are many things that can bring on an asthma attack or make asthma symptoms worse (triggers). Asthma triggers are different for each person. Common triggers include:  •Mold.      •Dust.      •Cigarette smoke.      •Cockroaches.      •Things that can cause allergy symptoms (allergens), such as animal dander or pollen from trees or grass.      •Air pollutants such as household , wood smoke, smog, or chemical odors.      •Cold air, weather changes, and winds (which increase molds and pollen in the air).      •Strong emotional expressions such as crying or laughing hard.      •Stress.      •Certain medicines (such as aspirin) or types of medicines (such as beta-blockers).      •Sulfites in foods and drinks. Foods and drinks that may contain sulfites include dried fruit, potato chips, and sparkling grape juice.      •Infections or inflammatory conditions such as the flu, a cold, or inflammation of the nasal membranes (rhinitis).      •Gastroesophageal reflux disease (GERD).      •Exercise or strenuous activity.        What are the signs or symptoms?    Symptoms of this condition may occur right after asthma is triggered or many hours later. Symptoms include:  •Wheezing. This can sound like whistling when you breathe.      •Excessive nighttime or early morning coughing.      •Frequent or severe coughing with a common cold.      •Chest tightness.      •Shortness of breath.      •Tiredness (fatigue) with minimal activity.        How is this diagnosed?    This condition is diagnosed based on:  •Your medical history.      •A physical exam.    •Tests, which may include:  •Lung function studies and pulmonary studies (spirometry). These tests can evaluate the flow of air in your lungs.      •Allergy tests.      •Imaging tests, such as X-rays.          How is this treated?    There is no cure for this condition, but treatment can help control your symptoms. Treatment for asthma usually involves:  •Identifying and avoiding your asthma triggers.    •Using medicines to control your symptoms. Generally, two types of medicines are used to treat asthma:  •Controller medicines. These help prevent asthma symptoms from occurring. They are usually taken every day.      •Fast-acting reliever or rescue medicines. These quickly relieve asthma symptoms by widening the narrow and tight airways. They are used as needed and provide short-term relief.        •Using supplemental oxygen. This may be needed during a severe episode.    •Using other medicines, such as:  •Allergy medicines, such as antihistamines, if your asthma attacks are triggered by allergens.      •Immune medicines (immunomodulators). These are medicines that help control the immune system.      •Creating an asthma action plan. An asthma action plan is a written plan for managing and treating your asthma attacks. This plan includes:  •A list of your asthma triggers and how to avoid them.      •Information about when medicines should be taken and when their dosage should be changed.      •Instructions about using a device called a peak flow meter. A peak flow meter measures how well the lungs are working and the severity of your asthma. It helps you monitor your condition.          Follow these instructions at home:    Controlling your home environment     Control your home environment in the following ways to help avoid triggers and prevent asthma attacks:  •Change your heating and air conditioning filter regularly.      •Limit your use of fireplaces and wood stoves.      •Get rid of pests (such as roaches and mice) and their droppings.      •Throw away plants if you see mold on them.      •Clean floors and dust surfaces regularly. Use unscented cleaning products.      •Try to have someone else vacuum for you regularly. Stay out of rooms while they are being vacuumed and for a short while afterward. If you vacuum, use a dust mask from a hardware store, a double-layered or microfilter vacuum  bag, or a vacuum  with a HEPA filter.      •Replace carpet with wood, tile, or vinyl isatu. Carpet can trap dander and dust.      •Use allergy-proof pillows, mattress covers, and box spring covers.      •Keep your bedroom a trigger-free room.       •Avoid pets and keep windows closed when allergens are in the air.      •Wash beddings every week in hot water and dry them in a dryer.      •Use blankets that are made of polyester or cotton.      •Clean bathrooms and wilver with bleach. If possible, have someone repaint the walls in these rooms with mold-resistant paint. Stay out of the rooms that are being cleaned and painted.      •Wash your hands often with soap and water. If soap and water are not available, use hand .      •Do not allow anyone to smoke in your home.      General instructions  •Take over-the-counter and prescription medicines only as told by your health care provider.  •Speak with your health care provider if you have questions about how or when to take the medicines.      •Make note if you are requiring more frequent dosages.        •Do not use any products that contain nicotine or tobacco, such as cigarettes and e-cigarettes. If you need help quitting, ask your health care provider. Also, avoid being exposed to secondhand smoke.      •Use a peak flow meter as told by your health care provider. Record and keep track of the readings.      •Understand and use the asthma action plan to help minimize, or stop an asthma attack, without needing to seek medical care.      •Make sure you stay up to date on your yearly vaccinations as told by your health care provider. This may include vaccines for the flu and pneumonia.      •Avoid outdoor activities when allergen counts are high and when air quality is low.      •Wear a ski mask that covers your nose and mouth during outdoor winter activities. Exercise indoors on cold days if you can.      •Warm up before exercising, and take time for a cool-down period after exercise.      •Keep all follow-up visits as told by your health care provider. This is important.        Where to find more information    •For information about asthma, turn to the Centers for Disease Control and Prevention at www.cdc.gov/asthma/faqs      •For air quality information, turn to [employer]Now at airnow.gov        Contact a health care provider if:    •You have wheezing, shortness of breath, or a cough even while you are taking medicine to prevent attacks.      •The mucus you cough up (sputum) is thicker than usual.      •Your sputum changes from clear or white to yellow, green, gray, or bloody.      •Your medicines are causing side effects, such as a rash, itching, swelling, or trouble breathing.      •You need to use a reliever medicine more than 2–3 times a week.      •Your peak flow reading is still at 50–79% of your personal best after following your action plan for 1 hour.      •You have a fever.        Get help right away if:    •You are getting worse and do not respond to treatment during an asthma attack.      •You are short of breath when at rest or when doing very little physical activity.      •You have difficulty eating, drinking, or talking.      •You have chest pain or tightness.      •You develop a fast heartbeat or palpitations.      •You have a bluish color to your lips or fingernails.      •You are light-headed or dizzy, or you faint.      •Your peak flow reading is less than 50% of your personal best.      •You feel too tired to breathe normally.        Summary    •Asthma is a long-term (chronic) condition that causes recurrent episodes in which the airways become tight and narrow. These episodes can cause coughing, wheezing, shortness of breath, and chest pain.      •Asthma cannot be cured, but medicines and lifestyle changes can help control it and treat acute attacks.      •Make sure you understand how to avoid triggers and how and when to use your medicines.      •Asthma attacks can range from minor to life threatening. Get help right away if you have an asthma attack and do not respond to treatment with your usual rescue medicines.      This information is not intended to replace advice given to you by your health care provider. Make sure you discuss any questions you have with your health care provider.      Document Revised: 09/17/2021 Document Reviewed: 04/21/2021    Elsevier Patient Education © 2022 [employer]

## 2022-05-17 NOTE — ED ADULT NURSE NOTE - OBJECTIVE STATEMENT
63 y/o male c/o SOB x 1 day. pt has hx of asthma and used pump today with no relief. pt denies CP, fever, chills.

## 2022-05-17 NOTE — ED ADULT TRIAGE NOTE - CHIEF COMPLAINT QUOTE
x 1 day of SOB. used his asthma pump this morning with no relief. PMH of asthma, no prior intubations. no wheezing noted. airway patent, breathing unlabored. denies fevers, chills, cough, CP.

## 2022-05-18 LAB — SARS-COV-2 RNA SPEC QL NAA+PROBE: SIGNIFICANT CHANGE UP

## 2022-06-09 ENCOUNTER — APPOINTMENT (OUTPATIENT)
Dept: UROLOGY | Facility: CLINIC | Age: 62
End: 2022-06-09
Payer: COMMERCIAL

## 2022-06-09 VITALS
DIASTOLIC BLOOD PRESSURE: 96 MMHG | WEIGHT: 198 LBS | RESPIRATION RATE: 16 BRPM | BODY MASS INDEX: 31.08 KG/M2 | HEART RATE: 78 BPM | OXYGEN SATURATION: 97 % | HEIGHT: 67 IN | SYSTOLIC BLOOD PRESSURE: 148 MMHG

## 2022-06-09 PROCEDURE — 51741 ELECTRO-UROFLOWMETRY FIRST: CPT

## 2022-06-09 PROCEDURE — 99214 OFFICE O/P EST MOD 30 MIN: CPT | Mod: 25

## 2022-06-09 PROCEDURE — 51798 US URINE CAPACITY MEASURE: CPT

## 2022-06-09 NOTE — ASSESSMENT
[FreeTextEntry1] : Diagnosis: BPH with LUTS, prostate cancer \par \par BPH with LUTS\par Stable, continue to monitor \par \par Prostate Cancer \par Patient was diagnosed with Grade Group 2 (3+4) prostate cancer and has been on active surveillance. We will repeat a PSA today. With increase in PSA and changes on prostate MRI, we discussed repeating a prostate biopsy. All risks of biopsy were discussed. Patient is agreeable to this plan and will be scheduled for fusion biopsy. \par Patient is electing to proceed with biopsy in 9/22 and he will repeat PSA before procedure (September). \par

## 2022-06-09 NOTE — REVIEW OF SYSTEMS
[Wake up at night to urinate  How many times?  ___] : wakes up to urinate [unfilled] times during the night [Slow urine stream] : slow urine stream [Negative] : Heme/Lymph [Urine Infection (bladder/kidney)] : denies bladder/kidney infections [Pain during urination] : denies pain during urination [Pain at onset of urination] : denies pain during onset of urination [Pain after urination] : denies pain after urination [Blood in urine that you can see] : denies seeing blood in urine [Told you have blood in urine on a urine test] : denies being told that blood was present in a urine test [Discharge from urine canal] : denies discharge from urine canal [History of kidney stones] : denies history of kidney stones [Urine retention] : denies urine retention [Strong urge to urinate] : denies strong urge to urinate [Bladder pressure] : denies bladder pressure [Strain or push to urinate] : denies straining or pushing to urinate [Wait a long time to urinate] : denies waiting a long time to urinate [Interrupted urine stream] : denies interrupted urine stream [Bladder fullness after urinating] : denies bladder fullness after urinating [Increased pain/discomfort with bladder filling] : denies increased pain/discomfort with bladder filling [Bladder problems as child. If yes, describe..] : denies bladder problems as child [Leakage of urine with straining, coughing, laughing] : denies leakage of urine with straining, coughing, and/or laughing [Unaware of when urine is leaking] : aware of when urine is leaking

## 2022-06-09 NOTE — HISTORY OF PRESENT ILLNESS
[Urinary Frequency] : urinary frequency [Weak Stream] : weak stream [FreeTextEntry1] : 62 yr old male with CC of prostate cancer. Recent PSA increase to 5.8 from 5.44. MRI from 22 showed 2 lesions #1-- 1.1 x 0.6 cm in right posterior base peripheral zone, PIRADS 5- this is a slight increase in size from MRI on 21 (0.8 x 06 cm).  Lesion #2 1.2 x 0.5 cm in left posteromedial base peripheral zone. Denies any changes in urinary symptoms. \par \par 10/26/21: Prostate Biopsy\par Grade Group 1 (3+3) left ant base\par Grade Group 2 (3+4) right PZPL-- targeted lesion \par \par Prostate size 37cc \par PSAD: 0.16 \par \par Uroflow\par Max: 42.7 ml/s\par Ave.7 ml/s\par Volume: 295.4 ml/s\par \par PVR: 201, voided then PVR 44 ml\par \par  [Urinary Incontinence] : no urinary incontinence [Urinary Retention] : no urinary retention [Urinary Urgency] : no urinary urgency [Straining] : no straining [Dysuria] : no dysuria [Hematuria - Gross] : no gross hematuria

## 2022-06-09 NOTE — PHYSICAL EXAM
[General Appearance - Well Developed] : well developed [General Appearance - Well Nourished] : well nourished [Normal Appearance] : normal appearance [Well Groomed] : well groomed [General Appearance - In No Acute Distress] : no acute distress [Abdomen Soft] : soft [Abdomen Tenderness] : non-tender [Costovertebral Angle Tenderness] : no ~M costovertebral angle tenderness [Penis Abnormality] : normal circumcised penis [Testes Tenderness] : no tenderness of the testes [Testes Mass (___cm)] : there were no testicular masses [Prostate Enlargement] : the prostate was not enlarged [Prostate Tenderness] : the prostate was not tender [Edema] : no peripheral edema [] : no respiratory distress [Respiration, Rhythm And Depth] : normal respiratory rhythm and effort [Exaggerated Use Of Accessory Muscles For Inspiration] : no accessory muscle use [Oriented To Time, Place, And Person] : oriented to person, place, and time [Affect] : the affect was normal [Mood] : the mood was normal [Not Anxious] : not anxious [Normal Station and Gait] : the gait and station were normal for the patient's age [No Focal Deficits] : no focal deficits

## 2022-06-13 ENCOUNTER — NON-APPOINTMENT (OUTPATIENT)
Age: 62
End: 2022-06-13

## 2022-06-13 LAB — PSA SERPL-MCNC: 8.59 NG/ML

## 2022-06-23 ENCOUNTER — APPOINTMENT (OUTPATIENT)
Dept: UROLOGY | Facility: CLINIC | Age: 62
End: 2022-06-23
Payer: COMMERCIAL

## 2022-06-23 VITALS — SYSTOLIC BLOOD PRESSURE: 135 MMHG | DIASTOLIC BLOOD PRESSURE: 87 MMHG | HEART RATE: 80 BPM | TEMPERATURE: 98.2 F

## 2022-06-23 PROCEDURE — 99215 OFFICE O/P EST HI 40 MIN: CPT

## 2022-06-23 NOTE — HISTORY OF PRESENT ILLNESS
[FreeTextEntry1] : Dr. Adam Osman\par \par CC: prostate cancer\par \par 62 yr old male with CC of prostate cancer.\par PSA increase to 8.59 from 5.8 from 5.44.\par MRI from 22 showed 2 lesions \par \par \par 10/26/21: Prostate Biopsy\par Grade Group 1 (3+3) left ant base\par Grade Group 2 (3+4) right PZPL-- targeted lesion \par \par Prostate size 37cc \par PSAD: 0.16 \par \par Erections normal \par \par Uroflow\par Max: 42.7 ml/s\par Ave.7 ml/s\par Volume: 295.4 ml/s\par \par PVR: 201, voided then PVR 44 ml\par \par SURG: knee, shoulder \par FAMHX: father prostate cancer;  of dementia \par MEDHX: asthma \par SOCIAL: works in retail, 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

## 2022-06-23 NOTE — ASSESSMENT
[FreeTextEntry1] : Diagnosis: \par Prostate cancer \par \par \par Plan\par Today we discussed the natural history of localized prostate cancer, and the heterogeneous biology of this malignancy.  We discussed the fact that many prostate cancers are slow growing and unlikely to metastasize or cause death, whereas others can be life threatening.  We reviewed risk stratification, staging, Gazelle scoring, and PSA levels as they pertain to risk.  \par \par All treatment options were reviewed.  This included active surveillance, androgen deprivation, emerging options such as focal therapy/HIFU/cryotherapy, radiation options (including IMRT, SBRT, brachytherapy) and surgical options (open vs. robotic surgery, nerve vs. non-nerve sparing).  Oncologic outcomes were compared and contrasted.  Risks of biochemical and clinical recurrence discussed.  Risks of needing adjuvant or salvage treatments reviewed.  We discussed the risks of secondary malignancy after radiation.  We discussed the opportunity for pathological staging with surgery vs. other options.  We discussed the possibility of positive margins, treatment failure, cancer recurrence and cancer-related death even with treatment. \par \par All potential side effects of treatment were reviewed including, but not limited to: short term or permanent stress urinary incontinence and/or short term or permanent erectile dysfunction, penile shortening, rectal symptoms/pain, perineal pain, and other side effects. \par \par All potential complications of treatment and surgery were reviewed including, but not limited to: risks of conversion from MIS to open surgery discussed, bleeding//life-threatening hemorrhage, rectal injury requiring colostomy or delayed recognition leading to fistula, vascular/bowel/adjacent visceral organ injury, trocar/access injury, the possibility of recognized vs. unrecognized/delayed-recognition injury, risks of thermal/blunt/sharp/retraction injury, risks of DVT, PE, MI, death, risks of cardiopulmonary/anesthesia related complications, positional injury, infection/collection/abscess, wound complications/dehiscence/seroma/cellulitis, urinoma/fistula, ureteral injury/obstruction, bladder neck contracture, penile shortening, meatal stenosis, urethral stricture, lymphocele, obturator nerve injury, prolonged anastomotic leak, and other complications. \par \par Plan: patient elects robotic prostatectomy\par \par Doron Cunningham MD, [facility] \par  of Urology \par Director of Laparoscopic and Robotic Surgery \par [facility] Director of Urology, [facility] \par Professor of Urology\par \par (Office) [phone]\par (Cell)  [phone] \par [email or web address]\par \par \par \par \par \par \par \par \par

## 2022-07-05 NOTE — ED ADULT NURSE NOTE - NSFALLRSKUNASSIST_ED_ALL_ED
Lucy Varner's prescription of Humalog is approved for a 90 day refill.  LOV 6/23/22 with upcoming 11/10/22    This refill has been completed per the [facility] Refill Policy.    Updated SS per LOV:  · Humalog   Blood Glucose Breakfast  (dialysis days) Breakfast  (non dialysis days) Lunch  Dinner   Below 79 mg/dL Correct low and eat + inject humalog after >100 Correct low and eat + inject humalog after >100 Correct low and eat + inject humalog after >100 Correct low and eat + inject humalog after >100    mg/dL  4 7 3 2   151 -200 mg/dL 5 8 4 3   201 -250 mg/dL 6 9 5 3   251 -300 mg/dL 7 10 5 4   301 -350 mg/dL 8 11 7 4   351 -400 mg/dL 8 11 8 4   Above 401 mg/dL 9 12 8           5           no

## 2022-08-18 ENCOUNTER — APPOINTMENT (OUTPATIENT)
Dept: UROLOGY | Facility: CLINIC | Age: 62
End: 2022-08-18

## 2022-08-18 VITALS
SYSTOLIC BLOOD PRESSURE: 134 MMHG | WEIGHT: 198 LBS | TEMPERATURE: 93.5 F | DIASTOLIC BLOOD PRESSURE: 89 MMHG | HEIGHT: 67 IN | HEART RATE: 72 BPM | BODY MASS INDEX: 31.08 KG/M2

## 2022-08-18 PROCEDURE — 51798 US URINE CAPACITY MEASURE: CPT

## 2022-08-18 PROCEDURE — 99214 OFFICE O/P EST MOD 30 MIN: CPT | Mod: 25

## 2022-08-18 PROCEDURE — 51741 ELECTRO-UROFLOWMETRY FIRST: CPT

## 2022-08-18 NOTE — ASSESSMENT
[FreeTextEntry1] : Patient remains clinically stable. He understands that his prostate cancer should be treated, although it is not an emergency. Patient does not wish to proceed with radiation. Due to patients age, race, nature of disease surgery is his best options. We discussed in detail the risks and benefits of RALP. We also discussed that a second biopsy will not change the recommendations on treatment. Patient will think this over and discuss with his partner. He will follow up in 3 months and will likely schedule RALP in January 2023. Patient was encouraged to perform Kegel exercises regularly

## 2022-08-18 NOTE — REVIEW OF SYSTEMS
[Wake up at night to urinate  How many times?  ___] : wakes up to urinate [unfilled] times during the night [Strain or push to urinate] : strain or push to urinate [Slow urine stream] : slow urine stream [Negative] : Heme/Lymph [see HPI] : see HPI

## 2022-08-18 NOTE — PHYSICAL EXAM
[General Appearance - Well Developed] : well developed [General Appearance - Well Nourished] : well nourished [Normal Appearance] : normal appearance [Well Groomed] : well groomed [General Appearance - In No Acute Distress] : no acute distress [Abdomen Soft] : soft [Abdomen Tenderness] : non-tender [Costovertebral Angle Tenderness] : no ~M costovertebral angle tenderness [Prostate Tenderness] : the prostate was not tender [No Prostate Nodules] : no prostate nodules [Edema] : no peripheral edema [] : no respiratory distress [Respiration, Rhythm And Depth] : normal respiratory rhythm and effort [Exaggerated Use Of Accessory Muscles For Inspiration] : no accessory muscle use [Oriented To Time, Place, And Person] : oriented to person, place, and time [Affect] : the affect was normal [Mood] : the mood was normal [Not Anxious] : not anxious [Normal Station and Gait] : the gait and station were normal for the patient's age [No Focal Deficits] : no focal deficits [Prostate Size ___ (0-4)] : prostate size [unfilled] (scale: 0-4) [FreeTextEntry1] : DIEGO unchanged

## 2022-08-18 NOTE — HISTORY OF PRESENT ILLNESS
[FreeTextEntry1] : 62 yr old male here for prostate cancer discussion. 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 Patient was on active surveillance, had MRI 4/6/22 which showed 2 lesions with an increase in PSA to 8.59. Had consultations with Dr. Cunningham, Dr. Portillo, and Dr. Spencer regarding RALP vs radiation. Patient wants to discuss second biopsy that was recommended by Dr. Portillo. \par \par Patient states urination is unchanged. His erections are normal. He has increased his fluid intake. \par \par Uroflow\par Max: 19.9 ml/s\par Avg 9.5 ml/s\par Vol 75 ml\par PVR: 132 ml \par

## 2022-08-31 ENCOUNTER — EMERGENCY (EMERGENCY)
Facility: HOSPITAL | Age: 62
LOS: 1 days | Discharge: HOME | End: 2022-08-31
Admitting: EMERGENCY MEDICINE
Payer: COMMERCIAL

## 2022-08-31 VITALS
DIASTOLIC BLOOD PRESSURE: 84 MMHG | WEIGHT: 190.04 LBS | SYSTOLIC BLOOD PRESSURE: 148 MMHG | HEART RATE: 77 BPM | TEMPERATURE: 98 F | HEIGHT: 67 IN | RESPIRATION RATE: 20 BRPM | OXYGEN SATURATION: 98 %

## 2022-08-31 DIAGNOSIS — S09.90XA UNSPECIFIED INJURY OF HEAD, INITIAL ENCOUNTER: ICD-10-CM
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
## 2022-08-31 DIAGNOSIS — Z88.0 ALLERGY STATUS TO PENICILLIN: ICD-10-CM
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
## 2022-08-31 PROCEDURE — G1004: CPT

## 2022-08-31 PROCEDURE — 99284 EMERGENCY DEPT VISIT MOD MDM: CPT

## 2022-08-31 PROCEDURE — 99284 EMERGENCY DEPT VISIT MOD MDM: CPT | Mod: 25

## 2022-08-31 PROCEDURE — 70450 CT HEAD/BRAIN W/O DYE: CPT | Mod: 26,MG

## 2022-08-31 PROCEDURE — 70450 CT HEAD/BRAIN W/O DYE: CPT | Mod: MG
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
## 2022-08-31 NOTE — ED PROVIDER NOTE - PATIENT PORTAL LINK FT
You can access the FollowMyHealth Patient Portal offered by [facility] by registering at the following website: [email or web address]. By joining "[employer]"’s FollowMyHealth portal, you will also be able to view your health information using other applications (apps) compatible with our system.

## 2022-08-31 NOTE — ED ADULT NURSE NOTE - BREATHING, MLM
"    3/22/2021         RE: Kayy Solomon  [address] [facility] [address]        Dear Colleague,    Thank you for referring your patient, Kayy Solomon, to the [facility]. Please see a copy of my visit note below.    This is a telephone encounter.  Declined video.  3/22/2021    Start time: 3:00    End time: 3:33    In the setting of COVID-19 outbreak patients visits are transitioned to phone visits/ virtual visits as per system and leadership guidelines.  I have personally reviewed data including notes, lab tests. I have reviewed and interpreted images personally.  This encounter is potentially equivalent to established 4 level (02340) clinic visit.    The patient has been notified of following:      \"This telephone visit will be conducted via a call between you and your physician/provider. We have found that certain health care needs can be provided without the need for a physical exam.  This service lets us provide the care you need with a short phone conversation.  If a prescription is necessary we can send it directly to your pharmacy.  If lab work is needed we can place an order for that and you can then stop by our lab to have the test done at a later time.     We will bill your insurance company for this service.  Please check with your medical insurance if this type of visit is covered. You may be responsible for the cost of this type of visit if insurance coverage is denied.  The typical cost is $30 (10min), $59 (11-20min) and $85 (21-30min).  Most often these visits are shorter than 10 minutes. With new updates with corona virus patient might be billed as clinic visit.     If during the course of the call the physician/provider feels a telephone visit is not appropriate, you will not be charged for this service.\"      Past medical history, social history, family history, allergy and medications were reviewed and updated as appropriate.  Reviewed all pertinent " labs, notes and imaging studies as appropriate.    Patient verbally consented to phone visit today: yes.    Disclaimer: This note consists of symbols derived from keyboarding, dictation and/or voice recognition software. As a result, there may be errors in the script that have gone undetected. Please consider this when interpreting information found in this chart.        ROS neg expect noted in notes.  Patient feels well at this time and denies any tachycardia, palpitations, heat intolerance, tremor, insomnia, diarrhea, or unexplained weight loss.  Patient also denies  cold intolerance, constipation, or unexplained weight gain.     ENDOCRINOLOGY CLINIC NOTE:  Name: Kayy Solomon  F/u of DM. Last visit 11/2020.  HPI:  Kayy Solomon is a 74 year old female who presents for the evaluation/management of Diabetes.   has a past medical history of Diabetes mellitus (H) (1994), Hyperlipidemia, Hypertension, and Iron deficiency anemia.    Was Using freestyle zaid but it not covered.  Diet is mostly stable.    When she was taking higher dose    1. Type 2 DM:  Orginally diagnosed at the age of: 1994  Current Regimen:  Metformin  mg and 1000 mg PM.  Januvia 100 mg/day  Novolog mix 70/30: 22 Untis in AM and 18 units at PM.     Tolerating metfomrin XR better at current dose.  Not able to tolerate higher dose of metformin XR.    Diabetes Medication(s)     Biguanides       metFORMIN (GLUCOPHAGE-XR) 500 MG 24 hr tablet    Take 1000 mg in AM and 500 mg PM    Dipeptidyl Peptidase-4 (DPP-4) Inhibitors       JANUVIA 100 MG tablet    TAKE ONE TABLET BY MOUTH ONCE DAILY    Insulin       insulin aspart prot & aspart (NOVOLOG MIX 70/30 FLEXPEN) (70-30) 100 UNIT/ML pen    Take 26 units in a.m. and  22 units p.m.        BS checks: FBG, 2:00 PM (2 hour after lunch), 9:00 PM ( 2 hour dinner)  Average Meter Download: Blood glucose data reviewed personally. See nursing note from this encounter for details.   No major  episodes of hypoglycemia noted/reported.     Exercise: Not much  Last A1c: 7.4 %  Symptoms of hypoglycemia (low blood sugar):  Gets symptoms of hypoglycemia.  Episodes of hypoglycemia: No    DM Complications:   Complications:   Diabetes Complications  Description / Detail    Diabetic Retinopathy  No   CAD / PAD  No   Neuropathy   Neuropathy.  Has numbness and tingling in bilateral lower extremities   Nephropathy / Microalbuminuria  + mild albuminuria.  Lab Results   Component Value Date    UMALCR Unable to calculate due to low value 08/31/2018         Gastroparesis  No   Hypoglycemia [place]arness  No       2. Hypertension:    On medications  3. Hyperlipidemia: on medications    PMH/PSH:  Past Medical History:   Diagnosis Date     Diabetes mellitus (H) 1994    in [facility]     Hyperlipidemia      Hypertension      Iron deficiency anemia     started vitron c 9/6/17.  Ordered colonscopy/endoscopy     Past Surgical History:   Procedure Laterality Date     C LAP OVARIAN CYSTOTOMY  1990    in [facility]     HYSTERECTOMY TOTAL ABDOMINAL, BILATERAL SALPINGO-OOPHORECTOMY, COMBINED  1992    chronic pain after cyst surgery in 1990     Family Hx:  Family History   Problem Relation Age of Onset     Diabetes Mother      Diabetes Father      Diabetes Sister      Asthma Sister        Social Hx:  Social History     Socioeconomic History     Marital status:      Spouse name: Not on file     Number of children: Not on file     Years of education: Not on file     Highest education level: Not on file   Occupational History     Employer: RETIRED   Social Needs     Financial resource strain: Not on file     Food insecurity     Worry: Not on file     Inability: Not on file     Transportation needs     Medical: Not on file     Non-medical: Not on file   Tobacco Use     Smoking status: Never Smoker     Smokeless tobacco: Never Used   Substance and Sexual Activity     Alcohol use: Yes     Comment: seldom (2-3 month)     Drug use: No      Sexual activity: Not Currently     Partners: Male   Lifestyle     Physical activity     Days per week: Not on file     Minutes per session: Not on file     Stress: Not on file   Relationships     Social connections     Talks on phone: Not on file     Gets together: Not on file     Attends Orthodoxy service: Not on file     Active member of club or organization: Not on file     Attends meetings of clubs or organizations: Not on file     Relationship status: Not on file     Intimate partner violence     Fear of current or ex partner: Not on file     Emotionally abused: Not on file     Physically abused: Not on file     Forced sexual activity: Not on file   Other Topics Concern     Parent/sibling w/ CABG, MI or angioplasty before 65F 55M? Not Asked   Social History Narrative     Not on file          MEDICATIONS:  has a current medication list which includes the following prescription(s): alcohol swab, atorvastatin, blood glucose, blood glucose, blood glucose calibration, blood glucose monitoring, blood glucose monitoring, enalapril, ferrous sulfate, insulin aspart prot & aspart, bd marguerite u/f, januvia, metformin, and sm aspirin adult low strength.    ROS     ROS: 10 point ROS neg other than the symptoms noted above in the HPI.    Physical Exam   VS: LMP  (LMP Unknown)   Breastfeeding No     LABS:  A1c:  Lab Results   Component Value Date    A1C 7.4 02/11/2021    A1C 7.1 09/11/2020    A1C 7.2 04/10/2019    A1C 6.7 08/31/2018    A1C 8.2 12/04/2017        Creatinine:  Creatinine   Date Value Ref Range Status   02/11/2021 0.67 0.52 - 1.04 mg/dL Final     Urine Micro:  Lab Results   Component Value Date    UMALCR 25.41 02/11/2021      LFTs/Lipids:  Recent Labs   Lab Test 02/11/21  0918 08/31/18  0900   CHOL 163 155   HDL 63 62   LDL 83 80   TRIG 83 63       TFTs:  TSH   Date Value Ref Range Status   05/02/2017 1.02 0.40 - 4.00 mU/L Final   All pertinent notes, labs, and images personally reviewed by me.     A/P  MsDylanKallitorie  Neha is a 74 year old here for the evaluation/management of diabetes:    1. DM2 - Under good control.  A1c 7.4%.  Diabetes complicated by neuropathy and mild albuminuria (On enalapril).  Mostly postmeal hyperglycemia.  No major episodes of hypoglycemia noted or reported  Plan:  Discussed diagnosis, pathophysiology, management and treatment options of condition with pt.  Discussed small increase in AM insulin dose but she is woried about hypoglycemia. In general BG in control. Plan to continue current DM regimen.  Metformin  mg and 1000 mg PM.  Januvia 100 mg/day  Novolog mix 70/30: 22 Untis in AM and 18 units at PM.  Labs in 3 months.  Follow up after that.    Previously I have asked her to check cost of GLP-1 RA as well as SGLT2 inhibitors.  Can consider in next visit if covered by insurance.  DM neuropathy: earlier discussed medications. She would like to hold off.    2. Hypertension - On enalapril 10 mg.  + mild microalbuminuria; is on enalapril.  Repeat labs in 3 months.  Recommend strict BG and BP control.    3. Hyperlipidemia - Under Good control.  LDL 83. On atorvastatin 10 mg. Followed by PCP.    4. Prevention  Opthalmology- 2018  ASA-81 mg  Smoking- no    Most Recent Immunizations   Administered Date(s) Administered     COVID-19,PF,Pfizer 03/05/2021     Influenza (High Dose) 3 valent vaccine 10/23/2018     Influenza Vaccine IM > 6 months Valent IIV4 10/23/2018     Influenza, Quad, High Dose, Pf, 65yr + 10/12/2020     Pneumo Conj 13-V (2010&after) 06/25/2015     Pneumococcal 23 valent 05/02/2017     TDAP Vaccine (Adacel) 05/02/2017     Recommend checking blood sugars before meals and at bedtime.    If Blood glucose are low more often-> 2-3 times/week- give us a call.  The patient is advised to Make better food choices: reduce carbs, Reduce portion size, weight loss and exercise 3-4 times a week.  Discussed hypoglycemia signs and symptoms as well as management in detail.    There is some  variability among people, most will usually develop symptoms suggestive of hypoglycemia when blood glucose levels are lowered to the mid 60's. The first set of symptoms are called adrenergic. Patients may experience any of the following nervousness, sweating, intense hunger, trembling, weakness, palpitations, and difficulty speaking.   The acute management of hypoglycemia involves the rapid delivery of a source of easily absorbed sugar. Regular soda, juice, lifesavers, table sugar, are good options. 15 grams of glucose is the dose that is given, followed by an assessment of symptoms and a blood glucose check if possible. If after 10 minutes there is no improvement, another 10-15 grams should be given. This can be repeated up to three times. The equivalency of 10-15 grams of glucose (approximate servings) are: Four lifesavers, 4 teaspoons of sugar, or 1/2 can of regular soda or juice.        All questions were answered.  The patient indicates understanding of the above issues and agrees with the plan set forth.     Follow-up:  3 months    Kimberly Jalloh M.D  Endocrinology  [facility]  CC: Sebastian Garcia    Disclaimer: This note consists of symbols derived from keyboarding, dictation and/or voice recognition software. As a result, there may be errors in the script that have gone undetected. Please consider this when interpreting information found in this chart.        Again, thank you for allowing me to participate in the care of your patient.        Sincerely,        Kimberly Jalloh MD     Spontaneous, unlabored and symmetrical

## 2022-08-31 NOTE — ED PROVIDER NOTE - CLINICAL SUMMARY MEDICAL DECISION MAKING FREE TEXT BOX
61 y/o m presents c/o headache after the metal gate to a parking garage came down on his head today.  No neuro deficits in ED, pt declined pain medicine.  CT head negative.  Will d/c, tylenol PRN pain, f/u pmd

## 2022-08-31 NOTE — ED ADULT TRIAGE NOTE - CHIEF COMPLAINT QUOTE
"I was walking in a parking garage and the barrier gate hit my head." rpts pain to top of head. denies vision changes, numbness/tingling, dizziness, LOC. not on blood thinners.

## 2022-08-31 NOTE — ED PROVIDER NOTE - OBJECTIVE STATEMENT
61 y/o m presents c/o headache after the gate to a parking garage came down on the top of his head this evening.  Pt stating he immediately felt dizzy and had pain, the dizziness has resolved but still having a mild headache.  Denies LOC, visual changes, unsteady gait, neck/back pain, all other ROS negative.

## 2022-09-06 ENCOUNTER — APPOINTMENT (OUTPATIENT)
Dept: UROLOGY | Facility: AMBULATORY SURGERY CENTER | Age: 62
End: 2022-09-06

## 2022-10-12 ENCOUNTER — APPOINTMENT (OUTPATIENT)
Dept: UROLOGY | Facility: HOSPITAL | Age: 62
End: 2022-10-12

## 2022-12-08 ENCOUNTER — APPOINTMENT (OUTPATIENT)
Dept: UROLOGY | Facility: CLINIC | Age: 62
End: 2022-12-08

## 2022-12-15 ENCOUNTER — APPOINTMENT (OUTPATIENT)
Dept: UROLOGY | Facility: CLINIC | Age: 62
End: 2022-12-15

## 2022-12-15 VITALS
RESPIRATION RATE: 16 BRPM | SYSTOLIC BLOOD PRESSURE: 133 MMHG | WEIGHT: 195 LBS | HEART RATE: 101 BPM | BODY MASS INDEX: 30.61 KG/M2 | OXYGEN SATURATION: 98 % | DIASTOLIC BLOOD PRESSURE: 86 MMHG | HEIGHT: 67 IN

## 2022-12-15 DIAGNOSIS — N40.1 BENIGN PROSTATIC HYPERPLASIA WITH LOWER URINARY TRACT SYMPMS: ICD-10-CM

## 2022-12-15 DIAGNOSIS — R97.20 ELEVATED PROSTATE, SPECIFIC ANTIGEN [PSA]: ICD-10-CM

## 2022-12-15 PROCEDURE — 51741 ELECTRO-UROFLOWMETRY FIRST: CPT

## 2022-12-15 PROCEDURE — 99215 OFFICE O/P EST HI 40 MIN: CPT | Mod: 25

## 2022-12-15 PROCEDURE — 51798 US URINE CAPACITY MEASURE: CPT

## 2022-12-15 NOTE — ADDENDUM
[FreeTextEntry1] : Next visit: post RALP follow-up \Esther Saini Chieffo completed this note as a scribe on behalf of Dr. Adam Osman M.D.

## 2022-12-15 NOTE — REVIEW OF SYSTEMS
[Wake up at night to urinate  How many times?  ___] : wakes up to urinate [unfilled] times during the night [Negative] : Heme/Lymph [see HPI] : see HPI [Nocturia] : nocturia [Strain or push to urinate] : strain or push to urinate [Slow urine stream] : slow urine stream

## 2022-12-15 NOTE — HISTORY OF PRESENT ILLNESS
[Weak Stream] : weak stream [FreeTextEntry1] : Patient presents for RALP consultation and inquiry regarding a follow up MRI. 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 Patient was on active surveillance, had MRI 4/6/22 which showed 2 lesions with an increase in PSA to 8.59 ng/mL on 06/09/2022. He had consultations with Dr. Cunningham, Dr. Portillo, and Dr. Spencer regarding RALP vs radiation. Patient states urination is unchanged. His erections are normal. He has increased his fluid intake and nocturia x1. \par

## 2022-12-15 NOTE — PHYSICAL EXAM
[General Appearance - Well Developed] : well developed [General Appearance - Well Nourished] : well nourished [Normal Appearance] : normal appearance [Well Groomed] : well groomed [General Appearance - In No Acute Distress] : no acute distress [Abdomen Soft] : soft [Abdomen Tenderness] : non-tender [Costovertebral Angle Tenderness] : no ~M costovertebral angle tenderness [Prostate Tenderness] : the prostate was not tender [Prostate Size ___ (0-4)] : prostate size [unfilled] (scale: 0-4) [Edema] : no peripheral edema [] : no respiratory distress [Respiration, Rhythm And Depth] : normal respiratory rhythm and effort [Exaggerated Use Of Accessory Muscles For Inspiration] : no accessory muscle use [Oriented To Time, Place, And Person] : oriented to person, place, and time [Affect] : the affect was normal [Mood] : the mood was normal [Not Anxious] : not anxious [Normal Station and Gait] : the gait and station were normal for the patient's age [No Focal Deficits] : no focal deficits [FreeTextEntry1] : right lobe nodular

## 2022-12-15 NOTE — ASSESSMENT
[FreeTextEntry1] : Patient remains clinically stable. He understands that his prostate cancer should be treated; due to patients age, race and nature of disease surgery is his best option. We discussed in detail the risks and benefits of RALP. We also discussed that another MRI will not change the recommendations on treatment. Patient decided to go forward with the procedure and will contact  to schedule likely for January/February 2023. Patient was encouraged to perform Kegel exercises regularly.  He will follow-up with us after his postoperative period.

## 2022-12-22 ENCOUNTER — NON-APPOINTMENT (OUTPATIENT)
Age: 62
End: 2022-12-22

## 2022-12-22 LAB — PSA SERPL-MCNC: 10.3 NG/ML

## 2022-12-27 ENCOUNTER — APPOINTMENT (OUTPATIENT)
Dept: UROLOGY | Facility: CLINIC | Age: 62
End: 2022-12-27

## 2022-12-27 VITALS
OXYGEN SATURATION: 98 % | SYSTOLIC BLOOD PRESSURE: 152 MMHG | HEART RATE: 77 BPM | DIASTOLIC BLOOD PRESSURE: 92 MMHG | TEMPERATURE: 97.3 F

## 2022-12-27 PROCEDURE — 99214 OFFICE O/P EST MOD 30 MIN: CPT

## 2022-12-27 NOTE — HISTORY OF PRESENT ILLNESS
[FreeTextEntry1] : Dr. Adam Osman\par \par CC: prostate cancer\par \par 62 yr old male with CC of prostate cancer.\par PSA increase to 8.59 from 5.8 from 5.44.\par MRI from 22 showed 2 lesions \par PSA up to 10.3 ng/ml \par \par 10/26/21: Prostate Biopsy\par Grade Group 1 (3+3) left ant base\par Grade Group 2 (3+4) right PZPL-- targeted lesion \par \par Prostate size 37cc \par PSAD: 0.16 \par \par Erections normal \par \par Uroflow\par Max: 42.7 ml/s\par Ave.7 ml/s\par Volume: 295.4 ml/s\par \par PVR: 201, voided then PVR 44 ml\par \par SURG: knee, shoulder \par FAMHX: father prostate cancer;  of dementia \par MEDHX: asthma \par SOCIAL: works in retail, 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

## 2022-12-27 NOTE — ASSESSMENT
[FreeTextEntry1] : Diagnosis: Prostate cancer \par \par Patient returns with further elevation of PSA to 10.30 ng/mL \par \par We reviewed his pathology, treatment options, risks, complications. \par \par He wishes to proceed with surgery. \par \par We will schedule. \par \par Doron Cunningham MD, FACS, FRCS \par  of Urology [facility]\par Director of Laparoscopic and Robotic Surgery \par [facility] Director of Urology, [facility] \par Professor of Urology\par \par (Office) [phone]\par (Cell)  [phone] \par [email or web address]\par \par \par

## 2022-12-28 ENCOUNTER — OUTPATIENT (OUTPATIENT)
Dept: OUTPATIENT SERVICES | Facility: HOSPITAL | Age: 62
LOS: 1 days | End: 2022-12-28
Payer: SELF-PAY

## 2022-12-28 ENCOUNTER — OUTPATIENT (OUTPATIENT)
Dept: OUTPATIENT SERVICES | Facility: HOSPITAL | Age: 62
LOS: 1 days | End: 2022-12-28
Payer: COMMERCIAL

## 2022-12-28 ENCOUNTER — APPOINTMENT (OUTPATIENT)
Dept: CT IMAGING | Facility: HOSPITAL | Age: 62
End: 2022-12-28
Payer: SELF-PAY

## 2022-12-28 PROCEDURE — 75571 CT HRT W/O DYE W/CA TEST: CPT | Mod: 26

## 2022-12-28 PROCEDURE — 75571 CT HRT W/O DYE W/CA TEST: CPT

## 2022-12-28 PROCEDURE — 71046 X-RAY EXAM CHEST 2 VIEWS: CPT

## 2022-12-28 PROCEDURE — 71046 X-RAY EXAM CHEST 2 VIEWS: CPT | Mod: 26

## 2023-01-04 ENCOUNTER — NON-APPOINTMENT (OUTPATIENT)
Age: 63
End: 2023-01-04

## 2023-01-04 ENCOUNTER — APPOINTMENT (OUTPATIENT)
Dept: HEART AND VASCULAR | Facility: CLINIC | Age: 63
End: 2023-01-04
Payer: COMMERCIAL

## 2023-01-04 VITALS
DIASTOLIC BLOOD PRESSURE: 82 MMHG | HEIGHT: 67 IN | SYSTOLIC BLOOD PRESSURE: 122 MMHG | HEART RATE: 89 BPM | RESPIRATION RATE: 16 BRPM | WEIGHT: 185 LBS | BODY MASS INDEX: 29.03 KG/M2 | TEMPERATURE: 98.4 F | OXYGEN SATURATION: 98 %

## 2023-01-04 DIAGNOSIS — R07.89 OTHER CHEST PAIN: ICD-10-CM

## 2023-01-04 DIAGNOSIS — Z78.9 OTHER SPECIFIED HEALTH STATUS: ICD-10-CM

## 2023-01-04 DIAGNOSIS — J45.909 UNSPECIFIED ASTHMA, UNCOMPLICATED: ICD-10-CM

## 2023-01-04 DIAGNOSIS — T78.40XA ALLERGY, UNSPECIFIED, INITIAL ENCOUNTER: ICD-10-CM

## 2023-01-04 DIAGNOSIS — R06.00 DYSPNEA, UNSPECIFIED: ICD-10-CM

## 2023-01-04 PROCEDURE — 99202 OFFICE O/P NEW SF 15 MIN: CPT | Mod: 25

## 2023-01-04 PROCEDURE — 93015 CV STRESS TEST SUPVJ I&R: CPT

## 2023-01-04 PROCEDURE — 93306 TTE W/DOPPLER COMPLETE: CPT

## 2023-01-04 RX ORDER — LORATADINE 5 MG/5 ML
10 SOLUTION, ORAL ORAL DAILY
Qty: 30 | Refills: 0 | Status: ACTIVE | COMMUNITY

## 2023-01-04 RX ORDER — ALBUTEROL 90 MCG
90 AEROSOL (GRAM) INHALATION
Refills: 0 | Status: DISCONTINUED | COMMUNITY
End: 2023-01-04

## 2023-01-04 RX ORDER — ALBUTEROL SULFATE 90 UG/1
108 (90 BASE) INHALANT RESPIRATORY (INHALATION)
Qty: 1 | Refills: 2 | Status: ACTIVE | COMMUNITY
Start: 2023-01-04

## 2023-01-04 NOTE — HISTORY OF PRESENT ILLNESS
[FreeTextEntry1] : 62 year male who comes for Cardiology evaluation prior to radical prostatectomy for prostate cancer. He had pneumonia as a child and subsequent asthma. He notes playing basketball until 20 years ago and running 3 miles a day until age 50. His asthma was worsened with running. He stopped exercise due to knee pain. He has lost 15 lbs with diet and plans to resume exercise. He is currently walking 1 mile without any symptoms. He chest pain, SOB,  dizziness, palpitations, orthopnea, PND or syncope. He denies having DM, hypertension or hyperlipidemia. He notes having a calcium score on December 27 at [facility] with a measure of 34 and 47%ile. He recalls having a stress test in his 40s which was normal. He describes chest pain which improves with passing flatus or belching.

## 2023-01-04 NOTE — ASSESSMENT
[FreeTextEntry1] : An EKG was performed to evaluate for arrhythmia and ischemia.\par \par At the time of the patient's visit an Echocardiogram was performed to evaluate LV function. At the time of the visit the results were reviewed with patient\par \par I informed the patient that I informed the patient that I did not find a Cardiovascular contraindication to the proposed prostate surgery at this time \par \par I encouraged continued risk factor reduction and gradual increase in aerobic activity as tolerated\par \par 32   minutes were spent discussing cardiac risk excluding procedure time \par

## 2023-01-18 ENCOUNTER — APPOINTMENT (OUTPATIENT)
Dept: UROLOGY | Facility: CLINIC | Age: 63
End: 2023-01-18
Payer: COMMERCIAL

## 2023-01-18 PROCEDURE — 99442: CPT

## 2023-01-23 LAB — SARS-COV-2 N GENE NPH QL NAA+PROBE: NOT DETECTED

## 2023-01-24 VITALS
DIASTOLIC BLOOD PRESSURE: 80 MMHG | WEIGHT: 187.17 LBS | TEMPERATURE: 97 F | HEART RATE: 64 BPM | SYSTOLIC BLOOD PRESSURE: 120 MMHG | HEIGHT: 66 IN | OXYGEN SATURATION: 98 % | RESPIRATION RATE: 18 BRPM

## 2023-01-24 NOTE — PRE-OP CHECKLIST - SELECT TESTS ORDERED
Echo, stress test/BMP/CBC/PT/PTT/INR/EKG/CXR/COVID-19 Echo, stress test/BMP/CBC/CMP/PT/PTT/INR/Type and Screen/Urinalysis/EKG/CXR/COVID-19

## 2023-01-24 NOTE — PATIENT PROFILE ADULT - FALL HARM RISK - UNIVERSAL INTERVENTIONS
Bed in lowest position, wheels locked, appropriate side rails in place/Call bell, personal items and telephone in reach/Instruct patient to call for assistance before getting out of bed or chair/Non-slip footwear when patient is out of bed/[place] to call system/Physically safe environment - no spills, clutter or unnecessary equipment/Purposeful Proactive Rounding/Room/bathroom lighting operational, light cord in reach

## 2023-01-24 NOTE — PRE-OP CHECKLIST - TEMPERATURE IN FAHRENHEIT (DEGREES F)
PATIENT INSTRUCTIONS    Treatment:  Ice: Apply ice for 20 minutes 3 times a day. No barrier between the ice and skin is needed when using cubes or frozen peas. If you are using a chemical ice pack please place a barrier between the ice pack and your skin. Medication Extra Strength Tylenol: Take 2 tablets up to 3 times a day as needed for pain    Continue home exercises daily    Follow-Up:  Call or return to the clinic as needed if these symptoms worsen fail to improve as anticipated, or if new symptoms develop. Time left: 7/9/2019 9:47 AM     Provider: Dr. Demario Driscoll        Please note: 24 hour notice for cancellation of appointment is required. You may receive a survey in the mail, or via the e-mail address that you have provided. We would appreciate if you could fill out the survey and provide us with any feedback on your experience regarding your visit today. Thank you for allowing us to provide you with your health care needs. Do not hesitate to call if you are experiencing severe pain, worsening or change in your pain, have symptoms of infection (fever, warmth, redness, increased drainage), or have any other problem that concerns you ~ [phone] (or [phone] after hours). Please remember when requesting refills on pain medication that the request should be made by Thursday at the [address] is open Monday-Friday, 8am-5pm, and closed on the weekends. No narcotic refills will be filled after hours. Additional Educational Resources: For additional resources regarding your symptoms, diagnosis, or further health information, please visit the Health Resources section on [email or web address] or the Online Health Resources section in [employer].
97.3

## 2023-01-25 ENCOUNTER — TRANSCRIPTION ENCOUNTER (OUTPATIENT)
Age: 63
End: 2023-01-25

## 2023-01-25 ENCOUNTER — RESULT REVIEW (OUTPATIENT)
Age: 63
End: 2023-01-25

## 2023-01-25 ENCOUNTER — INPATIENT (INPATIENT)
Facility: HOSPITAL | Age: 63
LOS: 0 days | Discharge: HOME | DRG: 708 | End: 2023-01-26
Attending: UROLOGY | Admitting: UROLOGY
Payer: COMMERCIAL

## 2023-01-25 ENCOUNTER — APPOINTMENT (OUTPATIENT)
Dept: UROLOGY | Facility: HOSPITAL | Age: 63
End: 2023-01-25

## 2023-01-25 DIAGNOSIS — C61 MALIGNANT NEOPLASM OF PROSTATE: ICD-10-CM

## 2023-01-25 DIAGNOSIS — Z98.890 OTHER SPECIFIED POSTPROCEDURAL STATES: Chronic | ICD-10-CM

## 2023-01-25 DIAGNOSIS — Z41.9 ENCOUNTER FOR PROCEDURE FOR PURPOSES OTHER THAN REMEDYING HEALTH STATE, UNSPECIFIED: Chronic | ICD-10-CM

## 2023-01-25 LAB
BLD GP AB SCN SERPL QL: NEGATIVE — SIGNIFICANT CHANGE UP
RH IG SCN BLD-IMP: POSITIVE — SIGNIFICANT CHANGE UP

## 2023-01-25 PROCEDURE — 55866 LAPS SURG PRST8ECT RPBIC RAD: CPT

## 2023-01-25 PROCEDURE — 88305 TISSUE EXAM BY PATHOLOGIST: CPT | Mod: 26

## 2023-01-25 PROCEDURE — 38571 LAPAROSCOPY LYMPHADENECTOMY: CPT

## 2023-01-25 PROCEDURE — 88331 PATH CONSLTJ SURG 1 BLK 1SPC: CPT | Mod: 26

## 2023-01-25 PROCEDURE — 88309 TISSUE EXAM BY PATHOLOGIST: CPT | Mod: 26

## 2023-01-25 DEVICE — SURGICEL FIBRILLAR 4 X 4": Type: IMPLANTABLE DEVICE | Status: FUNCTIONAL

## 2023-01-25 DEVICE — LIGATING CLIPS WECK HEMOLOK POLYMER MEDIUM-LARGE (GREEN) 6: Type: IMPLANTABLE DEVICE | Status: FUNCTIONAL

## 2023-01-25 RX ORDER — ENOXAPARIN SODIUM 100 MG/ML
40 INJECTION SUBCUTANEOUS EVERY 24 HOURS
Refills: 0 | Status: DISCONTINUED | OUTPATIENT
Start: 2023-01-26 | End: 2023-01-26

## 2023-01-25 RX ORDER — LORATADINE 10 MG/1
1 TABLET ORAL
Qty: 0 | Refills: 0 | DISCHARGE

## 2023-01-25 RX ORDER — ACETAMINOPHEN 500 MG
1000 TABLET ORAL ONCE
Refills: 0 | Status: COMPLETED | OUTPATIENT
Start: 2023-01-25 | End: 2023-01-25

## 2023-01-25 RX ORDER — ALBUTEROL 90 UG/1
2 AEROSOL, METERED ORAL
Qty: 0 | Refills: 0 | DISCHARGE

## 2023-01-25 RX ORDER — ALBUTEROL 90 UG/1
2 AEROSOL, METERED ORAL EVERY 6 HOURS
Refills: 0 | Status: DISCONTINUED | OUTPATIENT
Start: 2023-01-25 | End: 2023-01-26

## 2023-01-25 RX ORDER — SODIUM CHLORIDE 9 MG/ML
1000 INJECTION INTRAMUSCULAR; INTRAVENOUS; SUBCUTANEOUS
Refills: 0 | Status: DISCONTINUED | OUTPATIENT
Start: 2023-01-25 | End: 2023-01-26

## 2023-01-25 RX ORDER — KETOROLAC TROMETHAMINE 30 MG/ML
15 SYRINGE (ML) INJECTION EVERY 6 HOURS
Refills: 0 | Status: DISCONTINUED | OUTPATIENT
Start: 2023-01-25 | End: 2023-01-26

## 2023-01-25 RX ORDER — OXYBUTYNIN CHLORIDE 5 MG
5 TABLET ORAL ONCE
Refills: 0 | Status: COMPLETED | OUTPATIENT
Start: 2023-01-25 | End: 2023-01-25

## 2023-01-25 RX ORDER — ENOXAPARIN SODIUM 100 MG/ML
40 INJECTION SUBCUTANEOUS ONCE
Refills: 0 | Status: COMPLETED | OUTPATIENT
Start: 2023-01-25 | End: 2023-01-25

## 2023-01-25 RX ORDER — OXYBUTYNIN CHLORIDE 5 MG
5 TABLET ORAL EVERY 8 HOURS
Refills: 0 | Status: DISCONTINUED | OUTPATIENT
Start: 2023-01-25 | End: 2023-01-26

## 2023-01-25 RX ORDER — ACETAMINOPHEN 500 MG
650 TABLET ORAL EVERY 6 HOURS
Refills: 0 | Status: DISCONTINUED | OUTPATIENT
Start: 2023-01-25 | End: 2023-01-26

## 2023-01-25 RX ORDER — HYDROMORPHONE HYDROCHLORIDE 2 MG/ML
0.5 INJECTION INTRAMUSCULAR; INTRAVENOUS; SUBCUTANEOUS ONCE
Refills: 0 | Status: DISCONTINUED | OUTPATIENT
Start: 2023-01-25 | End: 2023-01-25

## 2023-01-25 RX ADMIN — ENOXAPARIN SODIUM 40 MILLIGRAM(S): 100 INJECTION SUBCUTANEOUS at 06:52

## 2023-01-25 RX ADMIN — HYDROMORPHONE HYDROCHLORIDE 0.5 MILLIGRAM(S): 2 INJECTION INTRAMUSCULAR; INTRAVENOUS; SUBCUTANEOUS at 11:45

## 2023-01-25 RX ADMIN — Medication 1000 MILLIGRAM(S): at 06:52

## 2023-01-25 RX ADMIN — SODIUM CHLORIDE 60 MILLILITER(S): 9 INJECTION INTRAMUSCULAR; INTRAVENOUS; SUBCUTANEOUS at 11:47

## 2023-01-25 RX ADMIN — Medication 650 MILLIGRAM(S): at 21:22

## 2023-01-25 RX ADMIN — Medication 650 MILLIGRAM(S): at 22:22

## 2023-01-25 RX ADMIN — Medication 15 MILLIGRAM(S): at 17:29

## 2023-01-25 RX ADMIN — Medication 5 MILLIGRAM(S): at 11:45

## 2023-01-25 RX ADMIN — Medication 15 MILLIGRAM(S): at 17:06

## 2023-01-25 NOTE — PROGRESS NOTE ADULT - PROBLEM SELECTOR PLAN 1
-stable  -Booth catheter- monitor I&Os  -ALEYDA drain- monitor output  -5 am ALEYDA creatinine  -OOB  -DVT prophylaxis  -am labs  -home meds  -diet: CLD

## 2023-01-25 NOTE — H&P ADULT - NSHPPHYSICALEXAM_GEN_ALL_CORE
General: well developed, well nourished, normal appearance, well groomed and no acute distress.   Abdomen: soft, non-tender and no costovertebral angle tenderness.   Genitourinary: the prostate was not tender . prostate size 2+ (scale: 0-4). right lobe nodular.   Skin: no rash.   Cardiovascular. no peripheral edema.   Pulmonary: no respiratory distress, normal respiratory rhythm and effort and no accessory muscle use.   Psychiatric: oriented to person, place, and time, the affect was normal, the mood was normal and not anxious.   Musculoskeletal:. the gait and station were normal for the patient's age.   Neurological: no focal deficits.

## 2023-01-25 NOTE — H&P ADULT - ASSESSMENT
63M with pca s/p SP RALP with PLND.    Regional  CLD  pain/nausea control  20fr [place]  SP ALEYDA drain, drain Cr in AM  OOBA/IS  DVT ppx

## 2023-01-25 NOTE — H&P ADULT - HISTORY OF PRESENT ILLNESS
CC: prostate cancer    62 yr old male with CC of prostate cancer.  PSA increase to 8.59 from 5.8 from 5.44.  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

## 2023-01-26 ENCOUNTER — TRANSCRIPTION ENCOUNTER (OUTPATIENT)
Age: 63
End: 2023-01-26

## 2023-01-26 VITALS
TEMPERATURE: 99 F | DIASTOLIC BLOOD PRESSURE: 85 MMHG | SYSTOLIC BLOOD PRESSURE: 126 MMHG | RESPIRATION RATE: 17 BRPM | OXYGEN SATURATION: 97 % | HEART RATE: 81 BPM

## 2023-01-26 LAB
ANION GAP SERPL CALC-SCNC: 9 MMOL/L — SIGNIFICANT CHANGE UP (ref 5–17)
BASOPHILS # BLD AUTO: 0.04 K/UL — SIGNIFICANT CHANGE UP (ref 0–0.2)
BASOPHILS NFR BLD AUTO: 0.4 % — SIGNIFICANT CHANGE UP (ref 0–2)
BUN SERPL-MCNC: 13 MG/DL — SIGNIFICANT CHANGE UP (ref 7–23)
CALCIUM SERPL-MCNC: 8.8 MG/DL — SIGNIFICANT CHANGE UP (ref 8.4–10.5)
CHLORIDE SERPL-SCNC: 101 MMOL/L — SIGNIFICANT CHANGE UP (ref 96–108)
CO2 SERPL-SCNC: 26 MMOL/L — SIGNIFICANT CHANGE UP (ref 22–31)
CREAT FLD-MCNC: 1.18 MG/DL — SIGNIFICANT CHANGE UP
CREAT FLD-MCNC: 1.26 MG/DL — SIGNIFICANT CHANGE UP
CREAT SERPL-MCNC: 1.19 MG/DL — SIGNIFICANT CHANGE UP (ref 0.5–1.3)
EGFR: 69 ML/MIN/1.73M2 — SIGNIFICANT CHANGE UP
EOSINOPHIL # BLD AUTO: 0.01 K/UL — SIGNIFICANT CHANGE UP (ref 0–0.5)
EOSINOPHIL NFR BLD AUTO: 0.1 % — SIGNIFICANT CHANGE UP (ref 0–6)
GLUCOSE SERPL-MCNC: 140 MG/DL — HIGH (ref 70–99)
HCT VFR BLD CALC: 40.5 % — SIGNIFICANT CHANGE UP (ref 39–50)
HGB BLD-MCNC: 12.6 G/DL — LOW (ref 13–17)
IMM GRANULOCYTES NFR BLD AUTO: 0.3 % — SIGNIFICANT CHANGE UP (ref 0–0.9)
LYMPHOCYTES # BLD AUTO: 1.69 K/UL — SIGNIFICANT CHANGE UP (ref 1–3.3)
LYMPHOCYTES # BLD AUTO: 15.9 % — SIGNIFICANT CHANGE UP (ref 13–44)
MCHC RBC-ENTMCNC: 26.9 PG — LOW (ref 27–34)
MCHC RBC-ENTMCNC: 31.1 GM/DL — LOW (ref 32–36)
MCV RBC AUTO: 86.4 FL — SIGNIFICANT CHANGE UP (ref 80–100)
MONOCYTES # BLD AUTO: 1.26 K/UL — HIGH (ref 0–0.9)
MONOCYTES NFR BLD AUTO: 11.9 % — SIGNIFICANT CHANGE UP (ref 2–14)
NEUTROPHILS # BLD AUTO: 7.6 K/UL — HIGH (ref 1.8–7.4)
NEUTROPHILS NFR BLD AUTO: 71.4 % — SIGNIFICANT CHANGE UP (ref 43–77)
NRBC # BLD: 0 /100 WBCS — SIGNIFICANT CHANGE UP (ref 0–0)
PLATELET # BLD AUTO: 216 K/UL — SIGNIFICANT CHANGE UP (ref 150–400)
POTASSIUM SERPL-MCNC: 3.9 MMOL/L — SIGNIFICANT CHANGE UP (ref 3.5–5.3)
POTASSIUM SERPL-SCNC: 3.9 MMOL/L — SIGNIFICANT CHANGE UP (ref 3.5–5.3)
RBC # BLD: 4.69 M/UL — SIGNIFICANT CHANGE UP (ref 4.2–5.8)
RBC # FLD: 13.5 % — SIGNIFICANT CHANGE UP (ref 10.3–14.5)
SODIUM SERPL-SCNC: 136 MMOL/L — SIGNIFICANT CHANGE UP (ref 135–145)
SPECIMEN SOURCE FLD: SIGNIFICANT CHANGE UP
WBC # BLD: 10.63 K/UL — HIGH (ref 3.8–10.5)
WBC # FLD AUTO: 10.63 K/UL — HIGH (ref 3.8–10.5)

## 2023-01-26 PROCEDURE — 86850 RBC ANTIBODY SCREEN: CPT

## 2023-01-26 PROCEDURE — 88309 TISSUE EXAM BY PATHOLOGIST: CPT

## 2023-01-26 PROCEDURE — 80048 BASIC METABOLIC PNL TOTAL CA: CPT

## 2023-01-26 PROCEDURE — C1889: CPT

## 2023-01-26 PROCEDURE — 88331 PATH CONSLTJ SURG 1 BLK 1SPC: CPT

## 2023-01-26 PROCEDURE — 36415 COLL VENOUS BLD VENIPUNCTURE: CPT

## 2023-01-26 PROCEDURE — 88305 TISSUE EXAM BY PATHOLOGIST: CPT

## 2023-01-26 PROCEDURE — S2900: CPT

## 2023-01-26 PROCEDURE — 82570 ASSAY OF URINE CREATININE: CPT

## 2023-01-26 PROCEDURE — 85025 COMPLETE CBC W/AUTO DIFF WBC: CPT

## 2023-01-26 PROCEDURE — 86901 BLOOD TYPING SEROLOGIC RH(D): CPT

## 2023-01-26 PROCEDURE — 86900 BLOOD TYPING SEROLOGIC ABO: CPT

## 2023-01-26 RX ORDER — CIPROFLOXACIN LACTATE 400MG/40ML
1 VIAL (ML) INTRAVENOUS
Qty: 6 | Refills: 0
Start: 2023-01-26 | End: 2023-01-28

## 2023-01-26 RX ADMIN — ENOXAPARIN SODIUM 40 MILLIGRAM(S): 100 INJECTION SUBCUTANEOUS at 06:52

## 2023-01-26 RX ADMIN — Medication 15 MILLIGRAM(S): at 14:52

## 2023-01-26 RX ADMIN — Medication 650 MILLIGRAM(S): at 12:00

## 2023-01-26 RX ADMIN — Medication 650 MILLIGRAM(S): at 13:00

## 2023-01-26 NOTE — DISCHARGE NOTE PROVIDER - CARE PROVIDER_API CALL
Doron Cunningham)  Urology  [address]  [place], [place] [id number]  Phone: [phone]  Fax: [phone]  Follow Up Time:

## 2023-01-26 NOTE — PROGRESS NOTE ADULT - SUBJECTIVE AND OBJECTIVE BOX
INTERVAL HPI/OVERNIGHT EVENTS:  No acute events overnight.    VITALS:    T(F): 98.4 (01-26-23 @ 05:00), Max: 99.2 (01-25-23 @ 13:43)  HR: 83 (01-26-23 @ 05:00) (64 - 99)  BP: 145/76 (01-26-23 @ 05:00) (120/80 - 164/87)  RR: 17 (01-26-23 @ 05:00) (17 - 22)  SpO2: 97% (01-26-23 @ 05:00) (96% - 100%)  Wt(kg): --    I&O's Detail    25 Jan 2023 07:01  -  26 Jan 2023 05:17  --------------------------------------------------------  IN:    sodium chloride 0.9%: 1020 mL  Total IN: 1020 mL    OUT:    Bulb (mL): 130 mL    Indwelling Catheter - Urethral (mL): 1200 mL  Total OUT: 1330 mL    Total NET: -310 mL          MEDICATIONS:    ANTIBIOTICS:      PAIN CONTROL:  acetaminophen     Tablet .. 650 milliGRAM(s) Oral every 6 hours PRN  ketorolac   Injectable 15 milliGRAM(s) IV Push every 6 hours PRN       MEDS:  oxybutynin 5 milliGRAM(s) Oral every 8 hours PRN      HEME/ONC  enoxaparin Injectable 40 milliGRAM(s) SubCutaneous every 24 hours        PHYSICAL EXAM:  General: No acute distress.  Alert and Oriented x 3  Abdominal Exam: appropriately soft, non-distended incision site C/D/I. ALEYDA drain w/ serosanguinous output.   Exam: Booth catheter in place draining yellow/clear.       LABS:                RADIOLOGY & ADDITIONAL TESTS:      
 Post OP/Procedure note: CELESTE [id number]    Patient reports to minimal abdominal and suprapubic discomfort appropriate to current state. He denies any nausea, vomiting, chest pain, sob, dizziness, weakness.     PE  GEN: NAD  ABD: incision dry and clean, ALEYDA drain with serosanguinous fluid, soft, minimally appropriate tender to palpation  : Booth catheter in place, urine pink clear    T(C): 37.3 (01-25-23 @ 13:43), Max: 37.3 (01-25-23 @ 13:43)  HR: 94 (01-25-23 @ 13:43) (64 - 99)  BP: 164/87 (01-25-23 @ 13:43) (120/80 - 164/87)  RR: 18 (01-25-23 @ 13:43) (18 - 22)  SpO2: 100% (01-25-23 @ 13:43) (98% - 100%)    01-25-23 @ 07:01  -  01-25-23 @ 14:13  --------------------------------------------------------  IN: 180 mL / OUT: 495 mL / NET: -315 mL

## 2023-01-26 NOTE — DISCHARGE NOTE PROVIDER - NSDCCPCAREPLAN_GEN_ALL_CORE_FT
PRINCIPAL DISCHARGE DIAGNOSIS  Diagnosis: Prostate cancer  Assessment and Plan of Treatment:       SECONDARY DISCHARGE DIAGNOSES  Diagnosis: Asthma  Assessment and Plan of Treatment:

## 2023-01-26 NOTE — DISCHARGE NOTE NURSING/CASE MANAGEMENT/SOCIAL WORK - PATIENT PORTAL LINK FT
You can access the FollowMyHealth Patient Portal offered by [facility] by registering at the following website: [email or web address]. By joining [employer]’s FollowMyHealth portal, you will also be able to view your health information using other applications (apps) compatible with our system.

## 2023-01-26 NOTE — PROGRESS NOTE ADULT - ASSESSMENT
63 year old male with history of CaP s/p RALP 01/25
ASSESSMENT: 63yMale w/ CaP s/p RALP. Patient is VSS, HDS, and afebrile.       PLAN:  Diet: clears  Pain control  Monitor Urine Output  DVT ppx: SCD  OOB/IS

## 2023-01-26 NOTE — DISCHARGE NOTE PROVIDER - HOSPITAL COURSE
64yo male with PMH of asthma and prostate cancer s/p RALP. Patient tolerated procedure well and no post operative complications were noted. Patient's VSS and he is he is hemodynamically stable. Patient is medically optimized for discharge with outpatient follow up.

## 2023-01-26 NOTE — DISCHARGE NOTE PROVIDER - NSDCFUADDINST_GEN_ALL_CORE_FT
Booth Catheter Instructions:  - Please care for and empty urinary catheter bag as instructed by nurse.    General Discharge Instructions:  Use Tylenol for pain control as needed  Please resume all regular home medications unless specifically advised not to take a particular medication. Also, please take any new medications as prescribed.  Please get plenty of rest, continue to ambulate several times per day, and drink adequate amounts of fluids.     Warning Signs:  Please call your doctor if you experience the following:  *You experience new chest pain, pressure, squeezing or tightness.  *New or worsening cough, shortness of breath, or wheeze.  *If you are vomiting and cannot keep down fluids or your medications.  *You are getting dehydrated due to continued vomiting, diarrhea, or other reasons. Signs of dehydration include dry mouth, rapid heartbeat, or feeling dizzy or faint when standing.  *You see blood or dark/black material when you vomit or have a bowel movement.  *You experience burning when you urinate, have blood in your urine, or experience a discharge.  *Your pain is not improving within 8-12 hours or is not gone within 24 hours. Call or return immediately if your pain is getting worse, changes location, or moves to your chest or back.  *You have shaking chills, or fever greater than 100.4 degrees Fahrenheit.  *Any change in your symptoms, or any new symptoms that concern you.

## 2023-01-26 NOTE — DISCHARGE NOTE NURSING/CASE MANAGEMENT/SOCIAL WORK - NSDCPEFALRISK_GEN_ALL_CORE
For information on Fall & Injury Prevention, visit: [email or web address] OR  [email or web address] OR  https://www.cdc.gov/steadi/patient.html

## 2023-01-26 NOTE — DISCHARGE NOTE PROVIDER - NSDCMRMEDTOKEN_GEN_ALL_CORE_FT
Albuterol (Eqv-ProAir HFA) 90 mcg/inh inhalation aerosol: 2 puff(s) inhaled every 6 hours  Cipro 250 mg oral tablet: 1 tab(s) orally every 12 hours MDD:2 tablets  Claritin 10 mg oral tablet: 1 tab(s) orally once a day, As Needed

## 2023-01-27 ENCOUNTER — NON-APPOINTMENT (OUTPATIENT)
Age: 63
End: 2023-01-27

## 2023-01-30 PROBLEM — E78.5 HYPERLIPIDEMIA, UNSPECIFIED: Chronic | Status: ACTIVE | Noted: 2023-01-24

## 2023-01-30 PROBLEM — J45.909 UNSPECIFIED ASTHMA, UNCOMPLICATED: Chronic | Status: ACTIVE | Noted: 2023-01-24

## 2023-01-30 PROBLEM — C61 MALIGNANT NEOPLASM OF PROSTATE: Chronic | Status: ACTIVE | Noted: 2023-01-24

## 2023-01-30 PROBLEM — R31.0 GROSS HEMATURIA: Chronic | Status: ACTIVE | Noted: 2023-01-24

## 2023-01-30 PROBLEM — N40.0 BENIGN PROSTATIC HYPERPLASIA WITHOUT LOWER URINARY TRACT SYMPTOMS: Chronic | Status: ACTIVE | Noted: 2023-01-24

## 2023-02-02 ENCOUNTER — APPOINTMENT (OUTPATIENT)
Dept: UROLOGY | Facility: CLINIC | Age: 63
End: 2023-02-02
Payer: COMMERCIAL

## 2023-02-02 ENCOUNTER — NON-APPOINTMENT (OUTPATIENT)
Age: 63
End: 2023-02-02

## 2023-02-02 VITALS
DIASTOLIC BLOOD PRESSURE: 80 MMHG | SYSTOLIC BLOOD PRESSURE: 149 MMHG | HEART RATE: 80 BPM | OXYGEN SATURATION: 98 % | TEMPERATURE: 98.3 F

## 2023-02-02 LAB — SURGICAL PATHOLOGY STUDY: SIGNIFICANT CHANGE UP

## 2023-02-02 PROCEDURE — 99024 POSTOP FOLLOW-UP VISIT: CPT

## 2023-02-02 NOTE — ASSESSMENT
[FreeTextEntry1] : DX: prostate cancer\par \par Call on phone for path review \par Plan is for PSA in 6 weeks\par Patient educated as to performance/importance of Kegel exercises\par \par Doron Cunningham MD, FACS, FRCS \par  of Urology [facility]\par Director of Laparoscopic and Robotic Surgery \par [facility] Director of Urology, [facility] \par Professor of Urology\par \par (Office) \par (Cell)  [phone] \par [email or web address] \par \par

## 2023-02-02 NOTE — PHYSICAL EXAM
[General Appearance - Well Developed] : well developed [General Appearance - Well Nourished] : well nourished [Normal Appearance] : normal appearance [Well Groomed] : well groomed [General Appearance - In No Acute Distress] : no acute distress [Edema] : no peripheral edema [] : no respiratory distress [Respiration, Rhythm And Depth] : normal respiratory rhythm and effort [Exaggerated Use Of Accessory Muscles For Inspiration] : no accessory muscle use [Abdomen Soft] : soft [Abdomen Tenderness] : non-tender [Costovertebral Angle Tenderness] : no ~M costovertebral angle tenderness [FreeTextEntry1] : well healing  [Urethral Meatus] : meatus normal [Penis Abnormality] : normal circumcised penis

## 2023-02-02 NOTE — HISTORY OF PRESENT ILLNESS
[FreeTextEntry1] : Dr. Adam Osman\par \par CC: Prostate cancer\par \par 62 yr old male with CC of prostate cancer.\par S/P single port robotic radical prostatectomy 23 \par \par Patient returns 1 week for catheter removal. \par Patient doing well without N/V/Fever. Urine clear, draining well. 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

## 2023-02-03 DIAGNOSIS — C61 MALIGNANT NEOPLASM OF PROSTATE: ICD-10-CM

## 2023-02-03 DIAGNOSIS — J45.909 UNSPECIFIED ASTHMA, UNCOMPLICATED: ICD-10-CM

## 2023-02-13 ENCOUNTER — TRANSCRIPTION ENCOUNTER (OUTPATIENT)
Age: 63
End: 2023-02-13

## 2023-02-24 ENCOUNTER — TRANSCRIPTION ENCOUNTER (OUTPATIENT)
Age: 63
End: 2023-02-24

## 2023-03-23 ENCOUNTER — APPOINTMENT (OUTPATIENT)
Dept: UROLOGY | Facility: CLINIC | Age: 63
End: 2023-03-23
Payer: COMMERCIAL

## 2023-03-23 VITALS
DIASTOLIC BLOOD PRESSURE: 77 MMHG | SYSTOLIC BLOOD PRESSURE: 147 MMHG | OXYGEN SATURATION: 99 % | TEMPERATURE: 97.7 F | HEART RATE: 60 BPM

## 2023-03-23 DIAGNOSIS — R31.0 GROSS HEMATURIA: ICD-10-CM

## 2023-03-23 DIAGNOSIS — M62.89 OTHER SPECIFIED DISORDERS OF MUSCLE: ICD-10-CM

## 2023-03-23 LAB
PSA SERPL-MCNC: 0.13 NG/ML
PSA SERPL-MCNC: 0.13 NG/ML

## 2023-03-23 PROCEDURE — 99024 POSTOP FOLLOW-UP VISIT: CPT

## 2023-03-23 NOTE — HISTORY OF PRESENT ILLNESS
[FreeTextEntry1] : Dr. 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

## 2023-03-23 NOTE — ASSESSMENT
[FreeTextEntry1] : Diagnosis: \par Prostate Cancer\par \par Plan:\par One cough today elicits significant leakage\par PSA today to confirm veracity and then again in 3 months \par Physical therapy\par Pathology for Decipher post RALP \par \par \par RTC in 3 months, if no improvement with  incontinence then cystoscopy at visit \par \par

## 2023-03-28 ENCOUNTER — NON-APPOINTMENT (OUTPATIENT)
Age: 63
End: 2023-03-28

## 2023-03-28 PROBLEM — R31.0 GROSS HEMATURIA: Status: ACTIVE | Noted: 2019-11-20

## 2023-03-30 LAB
APPEARANCE: CLEAR
BACTERIA: NEGATIVE
BILIRUBIN URINE: NEGATIVE
BLOOD URINE: NEGATIVE
COLOR: COLORLESS
GLUCOSE QUALITATIVE U: NEGATIVE
HYALINE CASTS: 0 /LPF
KETONES URINE: ABNORMAL
LEUKOCYTE ESTERASE URINE: NEGATIVE
MICROSCOPIC-UA: NORMAL
NITRITE URINE: NEGATIVE
PH URINE: 6
PROTEIN URINE: NEGATIVE
RED BLOOD CELLS URINE: 0 /HPF
SPECIFIC GRAVITY URINE: 1.01
SQUAMOUS EPITHELIAL CELLS: 0 /HPF
UROBILINOGEN URINE: NORMAL
WHITE BLOOD CELLS URINE: 0 /HPF

## 2023-03-30 NOTE — HISTORY OF PRESENT ILLNESS
[Home] : at home, [unfilled] , at the time of the visit. [Verbal consent obtained from patient] : the patient, [unfilled] [FreeTextEntry1] : Dr. Adam Osman\par Dr. Barry Sharif\par \par CC: Prostate Cancer\par \par Mr. Lara is a 63 year old male who presents today to discuss his radical prostatectomy which is scheduled for 22.\par Feeling well today.\par He was following with Dr. Osman and found to have an elevated PSA of 5.44 ng/mL back in 2021.  MRI from 2021 showed 41 cc gland with a PIRADs 4 lesion right posterolateral base.  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

## 2023-03-30 NOTE — ASSESSMENT
[FreeTextEntry1] : Diagnosis:  Prostate Cancer\par \par Plan:\par We discussed expected side effects of robotic surgery including but not limited to scrotal edema and ecchymosis that may last for greater than 1 week, referred should pain post procedure, bladder spams while catheter is in place, intermittent hematuria, and constipation management.\par \par We discussed catheter.  He is aware that catheter will be in place for minimum of 7 days post operatively.  He will start latisha cath antibiotics 1 day prior to removal.  I demonstrated how to properly switch from a leg bag to overnight bag.  We also reviewed importance of applying stat lock to prevent catheter from puling.\par \par It is important that he is up and ambulating day for procedure which he verbalized an understanding of.  We discussed incision care and time line for showering.  No heavy lifting for the next 4 weeks post procedure.\par \par He will begin Kegel exercises today with a goal of 50 Kegels per day.\par \par Follow up 1 week after procedure\par

## 2023-03-31 LAB — BACTERIA UR CULT: NORMAL

## 2023-09-14 LAB — PSA, POST - PROSTATECTOMY: 0.13 NG/ML

## 2023-09-19 ENCOUNTER — APPOINTMENT (OUTPATIENT)
Dept: UROLOGY | Facility: CLINIC | Age: 63
End: 2023-09-19
Payer: COMMERCIAL

## 2023-09-19 VITALS
HEART RATE: 70 BPM | SYSTOLIC BLOOD PRESSURE: 137 MMHG | DIASTOLIC BLOOD PRESSURE: 86 MMHG | OXYGEN SATURATION: 99 % | TEMPERATURE: 98 F

## 2023-09-19 PROCEDURE — 99213 OFFICE O/P EST LOW 20 MIN: CPT

## 2024-02-07 ENCOUNTER — APPOINTMENT (OUTPATIENT)
Dept: UROLOGY | Facility: CLINIC | Age: 64
End: 2024-02-07
Payer: COMMERCIAL

## 2024-02-07 ENCOUNTER — NON-APPOINTMENT (OUTPATIENT)
Age: 64
End: 2024-02-07

## 2024-02-07 VITALS
DIASTOLIC BLOOD PRESSURE: 70 MMHG | TEMPERATURE: 98 F | BODY MASS INDEX: 29.03 KG/M2 | WEIGHT: 185 LBS | HEIGHT: 67 IN | HEART RATE: 98 BPM | SYSTOLIC BLOOD PRESSURE: 128 MMHG | OXYGEN SATURATION: 97 %

## 2024-02-07 DIAGNOSIS — N52.9 MALE ERECTILE DYSFUNCTION, UNSPECIFIED: ICD-10-CM

## 2024-02-07 DIAGNOSIS — N39.3 STRESS INCONTINENCE (FEMALE) (MALE): ICD-10-CM

## 2024-02-07 DIAGNOSIS — C61 MALIGNANT NEOPLASM OF PROSTATE: ICD-10-CM

## 2024-02-07 PROCEDURE — 99214 OFFICE O/P EST MOD 30 MIN: CPT | Mod: 25

## 2024-02-07 PROCEDURE — 51798 US URINE CAPACITY MEASURE: CPT

## 2024-02-07 RX ORDER — OXYBUTYNIN CHLORIDE 5 MG/1
5 TABLET ORAL
Qty: 15 | Refills: 0 | Status: DISCONTINUED | COMMUNITY
Start: 2023-01-30 | End: 2024-02-07

## 2024-02-07 RX ORDER — TADALAFIL 5 MG/1
5 TABLET ORAL
Qty: 90 | Refills: 3 | Status: ACTIVE | COMMUNITY
Start: 2024-02-07 | End: 1900-01-01

## 2024-02-07 NOTE — REVIEW OF SYSTEMS
[Negative] : Heme/Lymph [see HPI] : see HPI [Nocturia] : nocturia [Wake up at night to urinate  How many times?  ___] : wakes up to urinate [unfilled] times during the night [Strain or push to urinate] : strain or push to urinate [Slow urine stream] : slow urine stream [Incontinence] : incontinence

## 2024-02-08 NOTE — ADDENDUM
[FreeTextEntry1] : Next Visit: PVR, Uroflow, PSA, schedule PSMA PET/CT  Entered by Prosper Patel, acting as scribe for Dr. Adam Osman. The documentation recorded by the scribe accurately reflects the service I personally performed and the decisions made by me.

## 2024-02-08 NOTE — LETTER BODY
[Dear  ___] : Dear  [unfilled], [Courtesy Letter:] : I had the pleasure of seeing your patient, [unfilled], in my office today. [Please see my note below.] : Please see my note below. [Consult Closing:] : Thank you very much for allowing me to participate in the care of this patient.  If you have any questions, please do not hesitate to contact me. [Sincerely,] : Sincerely, [FreeTextEntry3] : Adam Osman MD.

## 2024-02-08 NOTE — ASSESSMENT
[FreeTextEntry1] : Patient has bothersome stress incontinence and erectile dysfunction 1 year status post RALP; he is voiding adequately with mild post void residual. We encouraged him to maintain an adequate fluid intake. We advised him to perform Kegel exercises 4-5 times a day (today he demonstrated ability to perform these very well). He will start daily tadalafil 5 mg. His serum was sent for a follow-up PSA determination. If this is unremarkable, he will return in 2 months and if his voiding symptoms do not improve then we will refer him to Dr. Soto. If he has no response to tadalafil, he will have aa trial of intracavernosal alprostadil on the next visit.

## 2024-02-08 NOTE — HISTORY OF PRESENT ILLNESS
[FreeTextEntry1] : Patient presents 1 year status post RALP with incontinence and erectile dysfunction. He reports a "strong flow" only in the morning. He uses 3 pads a day. He is voiding with an adequate stream, nocturia x 1, no dysuria or hematuria. He performs Kegel exercises once a day. Patient used to see a physical therapist to improve his incontinence. Patient describes that he is unable to get erections since his prostatectomy. He does not take medication for his erectile dysfunction.  Surgical pathology did reveal some capsular penetration.  Last PSA on 9/14/2023 was 0.13 ng/ml. Urinalysis and urine culture on 3/29/2023 was unremarkable.

## 2024-02-12 ENCOUNTER — NON-APPOINTMENT (OUTPATIENT)
Age: 64
End: 2024-02-12

## 2024-02-12 LAB — PSA SERPL-MCNC: 0.3 NG/ML

## 2024-02-28 ENCOUNTER — TRANSCRIPTION ENCOUNTER (OUTPATIENT)
Age: 64
End: 2024-02-28

## 2024-03-07 ENCOUNTER — APPOINTMENT (OUTPATIENT)
Dept: UROLOGY | Facility: CLINIC | Age: 64
End: 2024-03-07

## 2024-04-02 ENCOUNTER — APPOINTMENT (OUTPATIENT)
Dept: UROLOGY | Facility: CLINIC | Age: 64
End: 2024-04-02

## 2024-08-07 NOTE — ED ADULT TRIAGE NOTE - HEIGHT IN CM
bilateral upper extremity Passive ROM was WFL (within functional limits)/bilateral lower extremity Passive ROM was WFL (within functional limits)
170.18

## 2024-12-02 ENCOUNTER — APPOINTMENT (OUTPATIENT)
Dept: UROLOGY | Facility: CLINIC | Age: 64
End: 2024-12-02

## 2024-12-02 VITALS
OXYGEN SATURATION: 98 % | TEMPERATURE: 98 F | DIASTOLIC BLOOD PRESSURE: 91 MMHG | SYSTOLIC BLOOD PRESSURE: 146 MMHG | HEART RATE: 88 BPM

## 2024-12-02 PROCEDURE — G2211 COMPLEX E/M VISIT ADD ON: CPT | Mod: NC

## 2024-12-02 PROCEDURE — 99215 OFFICE O/P EST HI 40 MIN: CPT

## 2024-12-02 RX ORDER — ZINC GLUCONATE 13.3 MG
LOZENGE ORAL
Qty: 3 | Refills: 11 | Status: ACTIVE | COMMUNITY
Start: 2024-12-02 | End: 1900-01-01

## 2024-12-03 LAB — PSA SERPL-MCNC: 0.33 NG/ML

## 2024-12-19 ENCOUNTER — APPOINTMENT (OUTPATIENT)
Dept: UROLOGY | Facility: CLINIC | Age: 64
End: 2024-12-19

## 2025-03-27 ENCOUNTER — APPOINTMENT (OUTPATIENT)
Dept: UROLOGY | Facility: CLINIC | Age: 65
End: 2025-03-27

## 2025-05-22 ENCOUNTER — APPOINTMENT (OUTPATIENT)
Dept: UROLOGY | Facility: CLINIC | Age: 65
End: 2025-05-22
Payer: COMMERCIAL

## 2025-05-22 ENCOUNTER — NON-APPOINTMENT (OUTPATIENT)
Age: 65
End: 2025-05-22

## 2025-05-22 VITALS
TEMPERATURE: 97.3 F | HEART RATE: 71 BPM | WEIGHT: 176 LBS | SYSTOLIC BLOOD PRESSURE: 152 MMHG | BODY MASS INDEX: 27.62 KG/M2 | HEIGHT: 67 IN | OXYGEN SATURATION: 98 % | DIASTOLIC BLOOD PRESSURE: 94 MMHG

## 2025-05-22 DIAGNOSIS — N39.3 STRESS INCONTINENCE (FEMALE) (MALE): ICD-10-CM

## 2025-05-22 DIAGNOSIS — N52.9 MALE ERECTILE DYSFUNCTION, UNSPECIFIED: ICD-10-CM

## 2025-05-22 DIAGNOSIS — C61 MALIGNANT NEOPLASM OF PROSTATE: ICD-10-CM

## 2025-05-22 DIAGNOSIS — N40.1 BENIGN PROSTATIC HYPERPLASIA WITH LOWER URINARY TRACT SYMPMS: ICD-10-CM

## 2025-05-22 DIAGNOSIS — M62.89 OTHER SPECIFIED DISORDERS OF MUSCLE: ICD-10-CM

## 2025-05-22 DIAGNOSIS — R97.20 ELEVATED PROSTATE, SPECIFIC ANTIGEN [PSA]: ICD-10-CM

## 2025-05-22 PROCEDURE — 99214 OFFICE O/P EST MOD 30 MIN: CPT | Mod: 25

## 2025-05-22 PROCEDURE — 51798 US URINE CAPACITY MEASURE: CPT

## 2025-05-22 PROCEDURE — 51741 ELECTRO-UROFLOWMETRY FIRST: CPT

## 2025-05-23 ENCOUNTER — NON-APPOINTMENT (OUTPATIENT)
Age: 65
End: 2025-05-23

## 2025-05-23 LAB — PSA SERPL-MCNC: 0.36 NG/ML

## 2025-05-27 ENCOUNTER — NON-APPOINTMENT (OUTPATIENT)
Age: 65
End: 2025-05-27

## 2025-06-03 ENCOUNTER — APPOINTMENT (OUTPATIENT)
Dept: UROLOGY | Facility: CLINIC | Age: 65
End: 2025-06-03

## 2025-06-26 ENCOUNTER — APPOINTMENT (OUTPATIENT)
Dept: NUCLEAR MEDICINE | Facility: HOSPITAL | Age: 65
End: 2025-06-26

## 2025-06-26 ENCOUNTER — OUTPATIENT (OUTPATIENT)
Dept: OUTPATIENT SERVICES | Facility: HOSPITAL | Age: 65
LOS: 1 days | End: 2025-06-26
Payer: COMMERCIAL

## 2025-06-26 DIAGNOSIS — Z98.890 OTHER SPECIFIED POSTPROCEDURAL STATES: Chronic | ICD-10-CM

## 2025-06-26 DIAGNOSIS — Z41.9 ENCOUNTER FOR PROCEDURE FOR PURPOSES OTHER THAN REMEDYING HEALTH STATE, UNSPECIFIED: Chronic | ICD-10-CM

## 2025-06-26 PROCEDURE — 78816 PET IMAGE W/CT FULL BODY: CPT | Mod: 26

## 2025-06-26 PROCEDURE — 78816 PET IMAGE W/CT FULL BODY: CPT

## 2025-06-26 PROCEDURE — A9596: CPT

## 2025-07-29 ENCOUNTER — EMERGENCY (EMERGENCY)
Facility: HOSPITAL | Age: 65
LOS: 1 days | End: 2025-07-29
Attending: STUDENT IN AN ORGANIZED HEALTH CARE EDUCATION/TRAINING PROGRAM | Admitting: STUDENT IN AN ORGANIZED HEALTH CARE EDUCATION/TRAINING PROGRAM
Payer: COMMERCIAL

## 2025-07-29 VITALS
TEMPERATURE: 98 F | DIASTOLIC BLOOD PRESSURE: 90 MMHG | HEIGHT: 70 IN | SYSTOLIC BLOOD PRESSURE: 153 MMHG | RESPIRATION RATE: 16 BRPM | OXYGEN SATURATION: 99 % | WEIGHT: 164.91 LBS | HEART RATE: 80 BPM

## 2025-07-29 DIAGNOSIS — Z98.890 OTHER SPECIFIED POSTPROCEDURAL STATES: Chronic | ICD-10-CM

## 2025-07-29 DIAGNOSIS — Z41.9 ENCOUNTER FOR PROCEDURE FOR PURPOSES OTHER THAN REMEDYING HEALTH STATE, UNSPECIFIED: Chronic | ICD-10-CM

## 2025-07-29 PROCEDURE — 99283 EMERGENCY DEPT VISIT LOW MDM: CPT | Mod: 25

## 2025-07-29 PROCEDURE — 90471 IMMUNIZATION ADMIN: CPT

## 2025-07-29 PROCEDURE — 99284 EMERGENCY DEPT VISIT MOD MDM: CPT

## 2025-07-29 PROCEDURE — 90715 TDAP VACCINE 7 YRS/> IM: CPT

## 2025-07-29 NOTE — ED PROVIDER NOTE - NS ED ROS FT
Constitutional: No fever or chills  Eyes: No discharge or drainage  Ears, Nose, Mouth, Throat: No nasal discharge, no sore throat  Cardiovascular: No chest pain, no palpitations  Respiratory: No shortness of breath, no cough  Gastrointestinal: No nausea or vomiting, no abdominal pain, no diarrhea or constipation  Musculoskeletal: No joint pain, no swelling  Skin: + wound  Neurological: No numbness, weakness, tingling, no headache  Psychiatric: No depression

## 2025-07-29 NOTE — ED PROVIDER NOTE - PATIENT PORTAL LINK FT
You can access the FollowMyHealth Patient Portal offered by [facility] by registering at the following website: [email or web address]. By joining [employer]’s FollowMyHealth portal, you will also be able to view your health information using other applications (apps) compatible with our system.

## 2025-07-29 NOTE — ED ADULT NURSE NOTE - NSICDXPASTSURGICALHX_GEN_ALL_CORE_FT
PAST SURGICAL HISTORY:  Elective surgery Biceps tendon rupture repair, rght shoulder    H/O knee surgery mensicus repair, left    History of biopsy prostate needle biopsy

## 2025-07-29 NOTE — ED PROVIDER NOTE - OBJECTIVE STATEMENT
65 year old M presenting for wound check. Dirty nail scraped patients dorsal aspect of R hand. Unsure of last tetanus. No other complaints. No fever, chills, redness, warmth, drainage or discharge. No other acute complaints. ROS as above.

## 2025-07-29 NOTE — ED ADULT NURSE NOTE - OBJECTIVE STATEMENT
Pt is a 64yo male presenting to ED c/o wound check. [place], noted ot have small red abrasion to top of right hand. Pt A&Ox4, breathing even and unlabored speaking in clear full sentences, ambulatory with steady gait, no acute distress, denies lightheadedness, dizziness, n/v/d, h/a, c/p, sob, f/c, vital signs stable, placed in gown.

## 2025-07-29 NOTE — ED PROVIDER NOTE - NSFOLLOWUPINSTRUCTIONS_ED_ALL_ED_FT
Please wash the area with soap and water. Apply bacitracin or neosporin to the area twice per day. Return for worsening symptoms, fever, chills, redness, warmth, drainage or discharge.

## 2025-07-29 NOTE — ED ADULT NURSE NOTE - CHIEF COMPLAINT QUOTE
Pt presents to ED C/O abrasion to R hand S/P " [place] UNK tetanus. Pt hand wrapped with gauze by EMS on arrival. Bleeding controlled.

## 2025-07-29 NOTE — ED ADULT NURSE NOTE - NSFALLUNIVINTERV_ED_ALL_ED
Bed/Stretcher in lowest position, wheels locked, appropriate side rails in place/Call bell, personal items and telephone in reach/Instruct patient to call for assistance before getting out of bed/chair/stretcher/Non-slip footwear applied when patient is off stretcher/[place] to call system/Physically safe environment - no spills, clutter or unnecessary equipment/Purposeful proactive rounding/Room/bathroom lighting operational, light cord in reach

## 2025-07-29 NOTE — ED ADULT NURSE NOTE - NSICDXPASTMEDICALHX_GEN_ALL_CORE_FT
PAST MEDICAL HISTORY:  Asthma     BPH (benign prostatic hyperplasia)     Gross hematuria     HLD (hyperlipidemia) in the past    Prostate cancer

## 2025-07-29 NOTE — ED PROVIDER NOTE - CLINICAL SUMMARY MEDICAL DECISION MAKING FREE TEXT BOX
66 yo with wound from nail, not infected, superficial, advised bacitracin, given tdap, return precautions

## 2025-07-29 NOTE — ED PROVIDER NOTE - PHYSICAL EXAMINATION
general: Well appearing, in no acute distress  HEENT: Normocephalic, atraumatic, extraocular movements intact  CV: Regular rate  Pulm: No respiratory distress, no tachypnea  Abd: Flat, no gross distension  Ext: warm and well perfused  Skin: No gross rashes or lesions, superficial 1 cm abrasion of dorsal aspect of R hand, no active bleeding, no erythema, warmth, drainage or discharge  Neuro: Alert and oriented, moving all extremities

## 2025-07-31 DIAGNOSIS — Z23 ENCOUNTER FOR IMMUNIZATION: ICD-10-CM
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
## 2025-07-31 DIAGNOSIS — S60.511A ABRASION OF RIGHT HAND, INITIAL ENCOUNTER: ICD-10-CM

## 2025-07-31 DIAGNOSIS — Z88.0 ALLERGY STATUS TO PENICILLIN: ICD-10-CM
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
## 2025-08-22 ENCOUNTER — APPOINTMENT (OUTPATIENT)
Dept: HEART AND VASCULAR | Facility: CLINIC | Age: 65
End: 2025-08-22

## 2025-08-22 VITALS
DIASTOLIC BLOOD PRESSURE: 84 MMHG | HEART RATE: 54 BPM | OXYGEN SATURATION: 99 % | BODY MASS INDEX: 28.25 KG/M2 | SYSTOLIC BLOOD PRESSURE: 144 MMHG | WEIGHT: 180 LBS | HEIGHT: 67 IN

## 2025-08-22 DIAGNOSIS — R06.09 OTHER FORMS OF DYSPNEA: ICD-10-CM

## 2025-08-22 DIAGNOSIS — R73.03 PREDIABETES.: ICD-10-CM

## 2025-08-22 DIAGNOSIS — R07.89 OTHER CHEST PAIN: ICD-10-CM

## 2025-08-22 DIAGNOSIS — E78.5 HYPERLIPIDEMIA, UNSPECIFIED: ICD-10-CM

## 2025-08-22 LAB
ALBUMIN SERPL ELPH-MCNC: 4.4 G/DL
ALP BLD-CCNC: 106 U/L
ALT SERPL-CCNC: 20 U/L
ANION GAP SERPL CALC-SCNC: 12 MMOL/L
AST SERPL-CCNC: 26 U/L
BILIRUB SERPL-MCNC: 1.2 MG/DL
BUN SERPL-MCNC: 10 MG/DL
CALCIUM SERPL-MCNC: 9.9 MG/DL
CHLORIDE SERPL-SCNC: 101 MMOL/L
CHOLEST SERPL-MCNC: 295 MG/DL
CO2 SERPL-SCNC: 26 MMOL/L
CREAT SERPL-MCNC: 1.1 MG/DL
EGFRCR SERPLBLD CKD-EPI 2021: 74 ML/MIN/1.73M2
GLUCOSE SERPL-MCNC: 98 MG/DL
HDLC SERPL-MCNC: 53 MG/DL
LDLC SERPL-MCNC: 223 MG/DL
NONHDLC SERPL-MCNC: 243 MG/DL
POTASSIUM SERPL-SCNC: 4.5 MMOL/L
PROT SERPL-MCNC: 7.5 G/DL
SODIUM SERPL-SCNC: 140 MMOL/L
TRIGL SERPL-MCNC: 111 MG/DL

## 2025-08-22 PROCEDURE — 99204 OFFICE O/P NEW MOD 45 MIN: CPT

## 2025-08-23 LAB
BASOPHILS # BLD AUTO: 0.05 K/UL
BASOPHILS NFR BLD AUTO: 1.2 %
EOSINOPHIL # BLD AUTO: 0.33 K/UL
EOSINOPHIL NFR BLD AUTO: 7.8 %
ESTIMATED AVERAGE GLUCOSE: 131 MG/DL
HBA1C MFR BLD HPLC: 6.2 %
HCT VFR BLD CALC: 49.3 %
HGB BLD-MCNC: 15.7 G/DL
IMM GRANULOCYTES NFR BLD AUTO: 0.2 %
LYMPHOCYTES # BLD AUTO: 1.61 K/UL
LYMPHOCYTES NFR BLD AUTO: 38.2 %
MAN DIFF?: NORMAL
MCHC RBC-ENTMCNC: 28 PG
MCHC RBC-ENTMCNC: 31.8 G/DL
MCV RBC AUTO: 88 FL
MONOCYTES # BLD AUTO: 0.43 K/UL
MONOCYTES NFR BLD AUTO: 10.2 %
NEUTROPHILS # BLD AUTO: 1.78 K/UL
NEUTROPHILS NFR BLD AUTO: 42.4 %
PLATELET # BLD AUTO: 205 K/UL
RBC # BLD: 5.6 M/UL
RBC # FLD: 12.6 %
WBC # FLD AUTO: 4.21 K/UL

## 2025-08-26 PROBLEM — R73.03 PRE-DIABETES: Status: ACTIVE | Noted: 2025-08-26

## 2025-08-26 PROBLEM — R06.09 DOE (DYSPNEA ON EXERTION): Status: ACTIVE | Noted: 2023-01-04

## 2025-09-09 ENCOUNTER — APPOINTMENT (OUTPATIENT)
Dept: CT IMAGING | Facility: CLINIC | Age: 65
End: 2025-09-09
Payer: COMMERCIAL

## 2025-09-09 PROCEDURE — 75574 CT ANGIO HRT W/3D IMAGE: CPT | Mod: 26

## (undated) DEVICE — FOLEY CATH 2-WAY 20FR 5CC UNCOATED SILICONE

## (undated) DEVICE — Device

## (undated) DEVICE — POSITIONER PINK PAD PIGAZZI SYSTEM XL W ARM PROTECTOR

## (undated) DEVICE — SUT VLOC 180 2-0 9" GS-22 GREEN

## (undated) DEVICE — SP COVER CAMERA SHEATH

## (undated) DEVICE — TUBING AIRSEAL TRI-LUMEN FILTERED

## (undated) DEVICE — SUT VLOC 180 3-0 6" V-20 GREEN

## (undated) DEVICE — SUT VICRYL 0 54" TIES

## (undated) DEVICE — DRAIN RESERVOIR FOR JACKSON PRATT 100CC CARDINAL

## (undated) DEVICE — TIP SCISSOR MCS TIP 10/PK

## (undated) DEVICE — SP MONOPOLAR SCISSOR CURVED 6MM

## (undated) DEVICE — DRAINAGE BAG URINARY 2L

## (undated) DEVICE — PLUG MALE FEMALE

## (undated) DEVICE — GOWN ROYAL SILK XL

## (undated) DEVICE — SP CLIP APPLIER MEDIUM-LARGE 6MM

## (undated) DEVICE — GLV 7.5 PROTEXIS (WHITE)

## (undated) DEVICE — PACK GENERAL LAPAROSCOPY

## (undated) DEVICE — TROCAR GELPOINT MINI ADVANCED

## (undated) DEVICE — SP BIPOLAR FORCEP FENSTRATED 6MM

## (undated) DEVICE — SYR CATH TIP 2 OZ

## (undated) DEVICE — SP ACCESS PORT SMALL INCISION

## (undated) DEVICE — FOLEY CATH 2-WAY 18FR 5CC LATEX HYDROGEL

## (undated) DEVICE — SUT CLIP LAPRA-TY ABSORBABLE SIZE 0.118 TO 0.12" VIOLET

## (undated) DEVICE — SUT VICRYL 0 27" UR-6

## (undated) DEVICE — INSUFFLATION NDL COVIDIEN SURGINEEDLE VERESS 120MM

## (undated) DEVICE — TROCAR COVIDIEN VERSAPORT BLADELESS OPTICAL 5MM STANDARD

## (undated) DEVICE — TIP METZENBAUM SCISSOR MONOPOLAR ENDOCUT (ORANGE)

## (undated) DEVICE — SUT VICRYL 3-0 27" SH

## (undated) DEVICE — FOLEY HOLDER STATLOCK 2 WAY ADULT

## (undated) DEVICE — SP DRAPE ARM

## (undated) DEVICE — SUT VICRYL 2-0 27" SH

## (undated) DEVICE — FOLEY CATH 3-WAY 24FR 30CC SOFT SIMPLASTIC

## (undated) DEVICE — SP SHEATH

## (undated) DEVICE — ENDOCATCH 10MM SPECIMEN POUCH

## (undated) DEVICE — SP KIT LAP ENTRYGUIDE STND L100X25MM

## (undated) DEVICE — SP MARYLAND BIPOLAR FORCEP 6MM

## (undated) DEVICE — TUBING ROSI REMOTE VTI

## (undated) DEVICE — DRAIN JACKSON PRATT 10MM FLAT 3/4 NO TROCAR

## (undated) DEVICE — DRSG DERMABOND 0.7ML

## (undated) DEVICE — SUT MONOCRYL 4-0 27" PS-2 UNDYED

## (undated) DEVICE — TROCAR SURGIQUEST AIRSEAL 12MMX100MM

## (undated) DEVICE — SP NEEDLE DRIVER 6MM

## (undated) DEVICE — GOWN XL